# Patient Record
Sex: FEMALE | Race: OTHER | Employment: UNEMPLOYED | URBAN - METROPOLITAN AREA
[De-identification: names, ages, dates, MRNs, and addresses within clinical notes are randomized per-mention and may not be internally consistent; named-entity substitution may affect disease eponyms.]

---

## 2017-04-20 ENCOUNTER — HOSPITAL ENCOUNTER (OUTPATIENT)
Dept: LAB | Age: 27
Discharge: HOME | End: 2017-04-20

## 2017-04-20 ENCOUNTER — OFFICE VISIT (OUTPATIENT)
Dept: FAMILY MEDICINE CLINIC | Age: 27
End: 2017-04-20

## 2017-04-20 VITALS
DIASTOLIC BLOOD PRESSURE: 79 MMHG | WEIGHT: 132 LBS | HEIGHT: 60 IN | BODY MASS INDEX: 25.91 KG/M2 | HEART RATE: 96 BPM | SYSTOLIC BLOOD PRESSURE: 120 MMHG | TEMPERATURE: 98.4 F

## 2017-04-20 DIAGNOSIS — R30.0 BURNING WITH URINATION: Primary | ICD-10-CM

## 2017-04-20 DIAGNOSIS — R30.0 BURNING WITH URINATION: ICD-10-CM

## 2017-04-20 DIAGNOSIS — B37.41 CANDIDA CYSTITIS: ICD-10-CM

## 2017-04-20 LAB
BILIRUB UR QL STRIP: NEGATIVE
GLUCOSE UR-MCNC: NEGATIVE MG/DL
KETONES P FAST UR STRIP-MCNC: NEGATIVE MG/DL
PH UR STRIP: 8.5 [PH] (ref 4.6–8)
PROT UR QL STRIP: ABNORMAL MG/DL
SP GR UR STRIP: 1.01 (ref 1–1.03)
UA UROBILINOGEN AMB POC: NORMAL (ref 0.2–1)
URINALYSIS CLARITY POC: CLEAR
URINALYSIS COLOR POC: YELLOW
URINE BLOOD POC: NEGATIVE
URINE LEUKOCYTES POC: NEGATIVE
URINE NITRITES POC: NEGATIVE

## 2017-04-20 PROCEDURE — 87086 URINE CULTURE/COLONY COUNT: CPT | Performed by: NURSE PRACTITIONER

## 2017-04-20 PROCEDURE — 87491 CHLMYD TRACH DNA AMP PROBE: CPT | Performed by: NURSE PRACTITIONER

## 2017-04-20 PROCEDURE — 87147 CULTURE TYPE IMMUNOLOGIC: CPT | Performed by: NURSE PRACTITIONER

## 2017-04-20 RX ORDER — FLUCONAZOLE 150 MG/1
150 TABLET ORAL DAILY
Qty: 1 TAB | Refills: 1 | Status: SHIPPED | OUTPATIENT
Start: 2017-04-20 | End: 2017-04-21

## 2017-04-20 RX ORDER — FLUCONAZOLE 150 MG/1
150 TABLET ORAL DAILY
Qty: 1 TAB | Refills: 1 | Status: SHIPPED | OUTPATIENT
Start: 2017-04-20 | End: 2017-04-20 | Stop reason: SDUPTHER

## 2017-04-20 NOTE — PROGRESS NOTES
At discharge station AVS was printed and reviewed with pt. Printed Good Rx coupon for Diflucan and rerouted RX to [facility] to save pt money since it is available for $4.00 instead of over $13. She knows that we will call with lab results and to RTC PRN if symptoms do not improve in one to two weeks.  Cale Terrell RN

## 2017-04-20 NOTE — PROGRESS NOTES
Subjective:     Chief Complaint   Patient presents with    Vaginal Itching     discharge, and burning with urination        She  is a 28 y.o. female who presents for evaluation of vaginal itching, dyspareunia and urinary burning. Pt notes dyspareunia (notes it as a annoyance vs true pain) started 2 weeks ago then she noted some whitish/yellow discharge. Approx 1 week ago Pt notes some \"strong\" urine but it is not the same as her UTI S&S last year. Denies frequency. Pt only attempted relief w/ water. Pt denies any flu like S&S, back pain nor fevers/chills. LMP end of march, last pap was last year and was WNL. Pt is not breastfeeding. ROS  Gen - no fever/chills  Resp - no dyspnea or cough  CV - no chest pain or BARNETT  Rest per HPI    Past Medical History:   Diagnosis Date    Screening for malignant neoplasm of the cervix 6/12/15    Negative (no hpv)      Past Surgical History:   Procedure Laterality Date    HX WISDOM TEETH EXTRACTION       Current Outpatient Prescriptions on File Prior to Visit   Medication Sig Dispense Refill    norethindrone (MICRONOR) 0.35 mg tab Take 1 Tab by mouth daily. 84 Tab 1    HYDROcodone-acetaminophen (NORCO) 5-325 mg per tablet Take 1 Tab by mouth every four (4) hours as needed for Pain. Max Daily Amount: 6 Tabs. 20 Tab 0    oxyCODONE-acetaminophen (PERCOCET) 5-325 mg per tablet Take 1-2 Tabs by mouth every four (4) hours as needed for Pain. Max Daily Amount: 12 Tabs. 15 Tab 0    AMBULATORY BREAST PUMP Use as directed. z39.1 1 Units 0    prenatal no. 39-iron-FA #6-dha 30 mg iron-1 mg -300 mg cmpk Take 2 Caps by mouth daily. 60 Each 12     No current facility-administered medications on file prior to visit.          Objective:     Vitals:    04/20/17 1047   BP: 120/79   Pulse: 96   Temp: 98.4 °F (36.9 °C)   TempSrc: Oral   Weight: 132 lb (59.9 kg)   Height: 5' 0.43\" (1.535 m)       Physical Examination:  General appearance - alert, well appearing, and in no distress  Eyes -sclera anicteric  Neck - supple, no significant adenopathy, no thyromegaly  Chest - clear to auscultation, no wheezes, rales or rhonchi, symmetric air entry  Heart - normal rate, regular rhythm, normal S1, S2, no murmurs, rubs, clicks or gallops  Neurological - alert, oriented, no focal findings or movement disorder noted  Abdomen-BS present/WNL x 4 quads, non-tender/distended, soft,no organomegaly    Recent Results (from the past 12 hour(s))   AMB POC URINALYSIS DIP STICK MANUAL W/O MICRO    Collection Time: 04/20/17 11:11 AM   Result Value Ref Range    Color (UA POC) Yellow     Clarity (UA POC) Clear     Glucose (UA POC) Negative Negative    Bilirubin (UA POC) Negative Negative    Ketones (UA POC) Negative Negative    Specific gravity (UA POC) 1.010 1.001 - 1.035    Blood (UA POC) Negative Negative    pH (UA POC) 8.5 (A) 4.6 - 8.0    Protein (UA POC) 1+ Negative mg/dL    Urobilinogen (UA POC) normal 0.2 - 1    Nitrites (UA POC) Negative Negative    Leukocyte esterase (UA POC) Negative Negative         Assessment/ Plan:   Jessenia Martin was seen today for vaginal itching. Diagnoses and all orders for this visit:    Burning with urination  -     AMB POC URINALYSIS DIP STICK MANUAL W/O MICRO  -     CULTURE, URINE; Future  -     CHLAMYDIA / Danella Pretzel; Future    Candida cystitis  -     fluconazole (DIFLUCAN) 150 mg tablet; Take 1 Tab by mouth daily for 1 day. Repeat dose in one week PRN. Low suspicion of UTI given UA/Hx. Given symptoms, suspect possible candida. Start Diflucan. R/O G/C and bacterial causes. Change POC based on urine studies. RTC PRN. Advised Pt to call if no improvement S&S in 1-2 weeks. May consider referral for pelvic exam.     I have discussed the diagnosis with the patient and the intended plan as seen in the above orders. The patient has received an after-visit summary and questions were answered concerning future plans.   I have discussed medication side effects and warnings with the patient as well. The patient verbalizes understanding and agreement with the plan. Follow-up Disposition:  Return if symptoms worsen or fail to improve.

## 2017-04-20 NOTE — PROGRESS NOTES
Coordination of Care  1. Have you been to the ER, urgent care clinic since your last visit? Hospitalized since your last visit? No    2. Have you seen or consulted any other health care providers outside of the [address] [place] [address] since your last visit? Include any pap smears or colon screening.  No    Medications  Medication Reconciliation Performed: no  Patient does not know need refills     Learning Assessment Complete? no  Results for orders placed or performed in visit on 04/20/17   AMB POC URINALYSIS DIP STICK MANUAL W/O MICRO   Result Value Ref Range    Color (UA POC) Yellow     Clarity (UA POC) Clear     Glucose (UA POC) Negative Negative    Bilirubin (UA POC) Negative Negative    Ketones (UA POC) Negative Negative    Specific gravity (UA POC) 1.010 1.001 - 1.035    Blood (UA POC) Negative Negative    pH (UA POC) 8.5 (A) 4.6 - 8.0    Protein (UA POC) 1+ Negative mg/dL    Urobilinogen (UA POC) normal 0.2 - 1    Nitrites (UA POC) Negative Negative    Leukocyte esterase (UA POC) Negative Negative

## 2017-04-20 NOTE — PATIENT INSTRUCTIONS
Candidiasis: Instrucciones de cuidado - [ Candidiasis: Care Instructions ]  Instrucciones de cuidado  La candidiasis es eun infección causada por el hongo en forma de levadura del tipo cándida. Por lo general, los hongos cándida viven en r[place]dale cuerpo. Sin embargo, pueden causar problemas si las defensas del organismo no funcionan jorje deberían. Algunos medicamentos pueden aumentar lisette probabilidades de contraer eun infección por cándida. Estos incluyen los antibióticos, los esteroides y los medicamentos para el cáncer. [address] y la diabetes pueden hacer que usted tenga infecciones por cándida con mayor facilidad. Hay varios tipos de infecciones por hongos en forma de levadura (cándida). Las aftas (candidosis bucal) es eun infección en la boca causada por la cándida. Suele ocurrir en personas cuyo sistema inmunitario es débil. Provoca manchas kiran en el interior de la boca y la garganta. Las infecciones cutáneas por cándida suelen ocurrir en los pliegues de piel donde esta se [place]. Provocan la aparición de manchas rojizas con secreción en la piel. Los bebés pueden contraer estas infecciones bajo el pañal. Las personas que utilizan guantes a menudo pueden tenerlas en las ana cristina. Muchas mujeres contraen candidiasis vaginal. Es muy común cuando las mujeres francisco antibióticos. Esta infección puede provocar Laura Zayra y ardor en la vagina. Igualmente puede sri lugar a un flujo similar al requesón (\"cottage cheese\"). En casos raros, la cándida [employer]. [place] puede causar [employer]. hazel tipo de infección se trata con medicamentos administrados por medio de eun inyección en eun vena (IV). Las infecciones por cándida suelen desaparecer rápidamente eun vez que se comienza el [place]. Sin embargo, si r[place]dale sistema inmunitario es débil, la infección podría reaparecer. Avísele a r[place]dale médico si contrae infecciones por cándida con frecuencia.   [address] seguimiento es eun parte clave de ragsdale tratamiento y seguridad. Asegúrese de hacer y acudir a todas las citas, y llame a r[place] médico si está teniendo problemas. También es eun buena idea saber los resultados de los exámenes y mantener eun lista de los medicamentos que geo. ¿Cómo puede cuidarse en el hogar? · [employer] medicamentos exactamente jorje le fueron recetados. Llame a r[place]dale médico si agustín estar teniendo un problema con r[place]dale medicamento. · [place] antibióticos solo cuando se lo recomiende el médico.  · [place] yogur con [employer] Contiene eun bacteria llamada lactobacilo que podría ayudar a prevenir algunos tipos de infecciones por cándida. · Mantenga la piel limpia y seca. Use talco en las zonas húmedas. · Si utiliza cremas o supositorios para tratar la candidiasis vaginal, no use preservativos ni diafragmas. Utilice otro método anticonceptivo. · Coma eun dieta saludable y christopher ejercicio regularmente. [place] ayudará a mantener isak ragsdale sistema inmunitario. ¿Cuándo debe pedir ayuda? Llame a r[place]dale médico ahora mismo o busque atención médica inmediata si:  · Tiene fiebre. · Dewain Manju y tiene señales de eun infección vaginal o eun infección urinaria, tales jorje:  ¨ Picazón intensa en la vagina. ¨ Dolor chang el acto sexual o cuando orina. ¨ Flujo vaginal anormal.  ¨ Ganas frecuentes de orinar. ¨ Tiene la orina turbia o con [employer]. Preste especial atención a los cambios en ragsdale [place] y asegúrese de comunicarse con ragsdale médico si:  · No mejora jorje se esperaba. ¿Dónde puede encontrar más información en inglés? Oralia Savealessandra a http://all-yolis.info/. Esmer Lights R622 en la búsqueda para aprender más acerca de \"Candidiasis: Instrucciones de cuidado - [ Candidiasis: Care Instructions ]. \"  Revisado: 13 octubre, 2016  Versión del contenido: 11.2  © 7451-7604 [employer].  Las instrucciones de cuidado fueron adaptadas bajo licencia por Good Help Connections (which disclaims liability or warranty for this information). Si usted tiene [place] afección médica o sobre estas instrucciones, siempre pregunte a r[place] profesional de [place]. [facility] niega toda garantía o responsabilidad por r[place] uso de esta información.

## 2017-04-21 LAB
C TRACH DNA SPEC QL NAA+PROBE: NEGATIVE
N GONORRHOEA DNA SPEC QL NAA+PROBE: NEGATIVE
SAMPLE TYPE: NORMAL
SERVICE CMNT-IMP: NORMAL
SPECIMEN SOURCE: NORMAL

## 2017-04-22 LAB
BACTERIA SPEC CULT: ABNORMAL
CC UR VC: ABNORMAL
SERVICE CMNT-IMP: ABNORMAL

## 2018-02-15 ENCOUNTER — OFFICE VISIT (OUTPATIENT)
Dept: FAMILY MEDICINE CLINIC | Age: 28
End: 2018-02-15

## 2018-02-15 VITALS
WEIGHT: 134 LBS | HEIGHT: 60 IN | BODY MASS INDEX: 26.31 KG/M2 | HEART RATE: 89 BPM | TEMPERATURE: 98.9 F | SYSTOLIC BLOOD PRESSURE: 117 MMHG | DIASTOLIC BLOOD PRESSURE: 83 MMHG

## 2018-02-15 DIAGNOSIS — N92.6 MISSED PERIOD: Primary | ICD-10-CM

## 2018-02-15 LAB
HCG URINE, QL. (POC): NEGATIVE
VALID INTERNAL CONTROL?: YES

## 2018-02-15 RX ORDER — RANITIDINE 150 MG/1
150 TABLET, FILM COATED ORAL 2 TIMES DAILY
Qty: 60 TAB | Refills: 2 | Status: ON HOLD | OUTPATIENT
Start: 2018-02-15 | End: 2018-10-19

## 2018-02-15 NOTE — PATIENT INSTRUCTIONS
No coma grasa, caffeina, o picante. No geo pastillas jorje aspirina, ibuprofen, aleve, diclofenacoa. Gastritis: Instrucciones de cuidado - [ Gastritis: Care Instructions ]  Instrucciones de cuidado    La gastritis es dolor y malestar estomacal. Sucede cuando algo irrita el revestimiento del estómago. Hay muchas cosas que pueden causarla. Entre estas se incluyen eun infección jorje la gripe o algo que ha comido o bebido. Los medicamentos o eun llaga en el recubrimiento del estómago ([place]) también pueden causarla. Puede tener abotagamiento y dolor abdominal. Podría eructar, vomitar y tener revoltura estomacal.  Usted debería poder aliviar el problema tomando medicamentos. Y july vez sería útil cambiar la alimentación. Si la gastritis continúa, ragsdale médico podría recetarle medicamentos. La atención de seguimiento es eun parte clave de ragsdale tratamiento y seguridad. Asegúrese de hacer y acudir a todas las citas, y llame a ragsdale médico si está teniendo problemas. También es eun buena idea saber los resultados de los exámenes y mantener eun lista de los medicamentos que geo. ¿Cómo puede cuidarse en el hogar? · Si r[place]dale médico le recetó antibióticos, tómelos según las indicaciones. No deje de tomarlos por el hecho de sentirse mejor. Debe christiane todos los antibióticos hasta terminarlos. · Sea ana laura con los medicamentos. Si ragsdale médico le recetó un medicamento para reducir el ácido estomacal, tómelo según las indicaciones. Llame a ragsdale médico si agustín estar teniendo problemas con ragsdale medicamento. · No tome ningún otro medicamento, incluyendo analgésicos (medicamentos para el dolor) de venta verna, sin consultar con ragsdale médico teo. · Si ragsdale médico le recomienda medicamentos de venta verna para reducir el ácido estomacal, tales jorje Pepcid AC, Prilosec, Tagamet HB o Zantac 75, siga las instrucciones de la etiqueta.   · Celeste abundantes líquidos (los suficientes jorje para que r[place]dale orina sea de color amarillo pawel o transparente jorje el agua) para prevenir la deshidratación. Elija christiane agua y otros líquidos zamzam sin cafeína. Si tiene [employer] [facility] [employer], el corazón o el hígado y tiene que Anny's líquidos, hable con ragsdale médico antes de aumentar ragsdale consumo. · Limite la cantidad de alcohol que nettie. · Evite el café, el té, las bebidas de cola, el chocolate y otros alimentos que contengan cafeína. Aumentan el ácido estomacal.  ¿Cuándo debe pedir ayuda? Llame al 911 en cualquier momento que considere que necesita atención de [place]. Por ejemplo, llame si:  ? · Vomita evelio o algo parecido a granos de café molido. ? · Bibi heces son de color rojizo o muy sanguinolentas (con evelio). ?Llame a ragsdale médico ahora mismo o busque atención médica inmediata si:  ? · Empieza a respirar en forma acelerada y no ha producido [place] en las últimas 8 horas. ? · No puede retener líquidos en el estómago. ?Preste especial atención a los cambios en ragsdale [place] y asegúrese de comunicarse con ragsdale médico si:  ? · No mejora jorje se esperaba. ¿Dónde puede encontrar más información en inglés? [place] a http://all-yolis.info/. Velton Kawasaki U724 en la búsqueda para aprender más acerca de \"Gastritis: Instrucciones de cuidado - [ Gastritis: Care Instructions ]. \"  Revisado: 12 sutton, 2017  Versión del contenido: 11.4  © 7963-3796 Healthwise, Incorporated. Las instrucciones de cuidado fueron adaptadas bajo licencia por Good Help Connections (which disclaims liability or warranty for this information). Si usted tiene [place] afección médica o sobre estas instrucciones, siempre pregunte a r[place]dale profesional de phoenix. [facility] niega toda garantía o responsabilidad por r[place]dale uso de esta información.

## 2018-02-15 NOTE — PROGRESS NOTES
Chief Complaint   Patient presents with    Epigastric Pain     Coordination of Care  1. Have you been to the ER, urgent care clinic since your last visit? Hospitalized since your last visit? No    2. Have you seen or consulted any other health care providers outside of the [address] [place] [address] since your last visit? Include any pap smears or colon screening. No    Medications  Does the patient need refills? NO    Learning Assessment Complete?  yes

## 2018-02-15 NOTE — PROGRESS NOTES
HISTORY OF PRESENT ILLNESS  Salud Gan is a 33 y.o. female. HPI Comments: 1 month h/o epigastric pain radiating to the back, worst after eating. Sx much worse over the last few days. No n/v or dark stools. Recently quit the bcp because of nausea and headaches, is not using bc now. lmp early January, preg test today here neg. H/o cholecyst in brandon, and the pain is similar to that. Is no longer breast-feeding. Uses no etoh/tobacco, occas otc nsaids, avoids caffeine and greasy foods. Epigastric Pain          ROS    Physical Exam   Constitutional: She appears well-nourished. No distress. Neck: No thyromegaly present. Cardiovascular: Normal rate and regular rhythm. Exam reveals no gallop and no friction rub. No murmur heard. Pulmonary/Chest: Breath sounds normal.   Abdominal: Soft. She exhibits no mass. There is tenderness. Epigastric tenderness. ASSESSMENT and PLAN  Gastritis-- ranitidine, dietary changes, avoid nsaids. F/u 1-2 months if sx persist, and then would check h. Pylori.

## 2018-05-07 LAB — URINALYSIS, EXTERNAL: NORMAL

## 2018-05-11 ENCOUNTER — OFFICE VISIT (OUTPATIENT)
Dept: OBGYN CLINIC | Age: 28
End: 2018-05-11

## 2018-05-11 VITALS
DIASTOLIC BLOOD PRESSURE: 60 MMHG | HEART RATE: 84 BPM | HEIGHT: 61 IN | BODY MASS INDEX: 25.86 KG/M2 | WEIGHT: 137 LBS | SYSTOLIC BLOOD PRESSURE: 102 MMHG

## 2018-05-11 DIAGNOSIS — Z32.01 POSITIVE PREGNANCY TEST: ICD-10-CM

## 2018-05-11 DIAGNOSIS — N92.6 MISSED MENSES: Primary | ICD-10-CM

## 2018-05-11 LAB
ANTIBODY SCREEN, EXTERNAL: NORMAL
CHLAMYDIA, EXTERNAL: NORMAL
HBSAG, EXTERNAL: NORMAL
HCT, EXTERNAL: 34.8
HGB, EXTERNAL: 11.7
HIV, EXTERNAL: NORMAL
N. GONORRHEA, EXTERNAL: NORMAL
PAP SMEAR, EXTERNAL: NORMAL
PLATELET CNT,   EXTERNAL: 255
RUBELLA, EXTERNAL: NORMAL
T. PALLIDUM, EXTERNAL: NORMAL

## 2018-05-11 RX ORDER — UREA 10 %
800 LOTION (ML) TOPICAL DAILY
Status: ON HOLD | COMMUNITY
End: 2018-10-19

## 2018-05-11 NOTE — PATIENT INSTRUCTIONS
[facility]Chart Help [facility]: [phone]       Magdalene Stephanie - [ Learning About Pregnancy ]  Instrucciones de cuidado    Bocanegra phoenix chang las primeras semanas del [facility] es de particular importancia para la phoenix de bocanegra bebé. Cuídese. Cualquier cosa que perjudique bocanegra organismo puede perjudicar a bocanegra bebé. Asegúrese de asistir a todas lisette citas médicas. Los chequeos médicos regulares les ayudarán a usted y a bocanegra bebé a mantenerse saludables. La atención de seguimiento es eun parte clave de bocanegra tratamiento y seguridad. Asegúrese de hacer y acudir a todas las citas, y llame a bocanegra médico si está teniendo problemas. También es eun buena idea saber los resultados de los exámenes y mantener eun lista de los medicamentos que geo. Cómo puede cuidarse en el hogar? ? Dieta  ? · Siga eun dieta balanceada. Asegúrese de incluir en bocanegra dieta abundantes frijoles (habichuelas), arvejas (chícharos) y verduras de hojas verdes. ? · No se saltee las comidas ni pase muchas horas sin comer. Si tiene náuseas, trate de comer un refrigerio pequeño y saludable cada 2 a 3 horas. ? · No consuma pescados que tengan [address] de sarah, jorje tiburón, pez que o [place]. No coma más de eun jazmine de atún a la semana. ? · Celeste abundantes líquidos, suficientes para que bocanegra orina sea de color amarillo pawel o transparente jorje el agua. Si tiene [employer], el corazón o el hígado y tiene que [place]'s líquidos, hable con bocanegra médico antes de aumentar bocanegra consumo. ? · Reduzca la cafeína, jorje el café, el té y las bebidas de cola. ? · No celeste alcohol, jorje cerveza, vino o licor isak. ? · [place] un multivitamínico que contenga al menos 400 microgramos (mcg) de ácido fólico para ayudar a prevenir los defectos congénitos (de nacimiento). El cereal enriquecido y el perez integral son buenas rivera adicionales de ácido fólico.   ? · Aumente el calcio en bocanegra [place].  Trate de beber un cuarto de javier [employer] todos los días. También podría christiane suplementos de calcio y elegir alimentos jorje el queso y el yogur. ? [address]  ? · Asegúrese de asistir a las citas de seguimiento. ? · Descanse lo suficiente. Mientras esté embarazada podría sentirse más cansada de lo normal.   ? · Aby por lo menos 30 minutos de ejercicio la mayoría de los días de la [place]. Caminar es eun buena opción. Si no ha hecho ejercicio en el pasado, comience poco a poco. Aby varias caminatas cortas todos los días. ? · No fume. Si necesita ayuda para dejar de fumar, hable con r[place] médico sobre los programas para dejar de fumar. Éstos pueden aumentar lisette probabilidades de dejar el hábito para siempre. ? · No toque heces de nnamdi ni r[place]dale caja de excrementos. Además, lávese las ana cristina luego de manipular carne cruda y cocine jessica toda la carne antes de comerla. Use guantes cuando trabaje en el jardín y [employer] ana cristina cuando termine. Las heces de [employer], la carne cruda o poco cocida, y la yuriy contaminada pueden causar infecciones que podrían perjudicar a ragsdale bebé o conducir a un aborto espontáneo. ? · No use \"jacuzzis\" ni saunas. Elevar la temperatura corporal podría perjudicar a ragsdale bebé. ? · Evite los gases de las sustancias químicas y la pintura, o los venenos. ? · No use drogas ilegales ni aleksandar alcohol. Medicamentos  ? · Revise todos los medicamentos que esté tomando con ragsdale [place] sea necesario cambiar algunos de lisette medicamentos de rutina para proteger al bebé. ? · [place] acetaminofén (Tylenol) para [facility], jorje dolor de [place] o de espalda leves o fiebre leve con síntomas de resfriado. No utilice medicamentos antiinflamatorios no esteroideos (EDUARDO), tales jorje ibuprofeno (Advil, Motrin) o naproxeno (Aleve), a menos que ragsdale médico lo autorice. ? · No tome dos o más analgésicos (medicamentos para el dolor) al [employer] tiempo a menos que el médico se lo haya indicado.  Muchos analgésicos contienen acetaminofén, es decir, Tylenol. El exceso de acetaminofén (Tylenol) puede ser dañino. ? · [employer]onic exactamente jorje le fueron recetados. Llame a ragsdale médico si agustín estar teniendo problemas con ragsdale medicamento. ?[address]  ? · Si siente náuseas al levantarse, pruebe christiane un refrigerio pequeño (jorje galletas saladas) antes de salir de la cama. Dese algún tiempo para digerir el refrigerio y salga de la cama lentamente. ? · No se saltee las comidas ni pase mucho tiempo sin comer. Un estómago [address] náuseas. ? · Consuma comidas pequeñas y frecuentes en lugar de ghassan comidas abundantes al día. ? · [place] abundantes líquidos. Las bebidas deportivas, jorje Gatorade o [place], son buenas opciones. ? · Consuma alimentos ricos en proteínas anyi bajos en grasas. ? · Si está tomando suplementos de leatha, pregúntele a ragsdale médico si son necesarios. El leatha puede [employer]. ? · Evite cualquier [employer] produzca náuseas, jorje el del café. ? · Descanse mucho. Las náuseas del embarazo podrían empeorar si está cansada. Dónde puede encontrar más información en inglés? Jarad Thurston a http://all-yolis.info/. Heather Vela N391 en la búsqueda para aprender más acerca de \"Aprenda sobre el Yoselin Julien - [ Raúl Zuñiga About Pregnancy ]. \"  Revisado: 16 marzo, 2017  Versión del contenido: 11.4  © 6009-5568 Healthwise, Incorporated. Las instrucciones de cuidado fueron adaptadas bajo licencia por Good Help Connections (which disclaims liability or warranty for this information). Si usted tiene [place] afección médica o sobre estas instrucciones, siempre pregunte a r[place]dale profesional de [place]. [facility] niega toda garantía o responsabilidad por r[place]dale uso de esta información.

## 2018-05-11 NOTE — MR AVS SNAPSHOT
[address] [place] [address] [address] [place] [address] 
[phone] Patient: Gabriel Delgado MRN: [id number] Visit Information Jose Downey y Shashi Personal Médico Departamento Teléfono del Dep. Número de visita 5/11/2018 10:00 AM Camilla Cox, [address] [phone] Your Appointments 5/18/2018  9:30 AM  
[facility] with MARISSA [facility] ([facility] Appt Note: EOB/US DM pt/? lmp 1/15/18/blue phone for Mauritanian; U/S & f/u  
 [address] [place] [address] [address] [id number]  
[address] 5/18/2018  9:40 AM  
ESTABLISHED PATIENT with MD Javi Barajas ([facility]) Appt Note: U/S & f/u  
 [address] [facility] [address] [address]  
[address] Upcoming Health Maintenance Date Due  
 PAP AKA CERVICAL CYTOLOGY 6/12/2018 Influenza Age 5 to Adult 8/1/2018 Alergias  Review Complete El: 5/11/2018 Por: Анна Benny A partir del:  5/11/2018 No Known Allergies Vacunas actuales Revisadas el:  12/9/2015 Aurelia Bryan Influenza Vaccine Page Echevaria) 9/29/2015 Tdap 10/27/2015  2:02 PM  
  
 No revisadas esta visita Partes vitales PS [place] ( percentil de crecimiento) Peso (percentil de crecimiento) LMP (última hailey) Está amamantando?  
 102/60 84 5' 1\" (1.549 m) 137 lb (62.1 kg) 01/15/2018 (Approximate) No  
 BMI (IMC) Estado obstétrico Estatus de tabaquísmo 25.89 kg/m2 Pregnant Never Smoker BMI and BSA Data Body Mass Index Body Surface Area  
 25.89 kg/m 2 1.63 m 2 Pranay Iraheta [facility] Name Phone  [facility] [place] [facility], [address] [facility] [phone] Bocanegra lista de medicamentos actualizada Darryle Mass actualizada 5/11/18 10:41 AM.  Tariq Romp use bocanegra lista de medicamentos más reciente. folic acid 265 mcg tablet Take 800 mcg by mouth daily. PRENATA PO Take  by mouth. raNITIdine 150 mg tablet También conocido jorje:  ZANTAC Take 1 Tab by mouth two (2) times a day. Instrucciones para el Paciente MyChart Help Desk: [phone] Elvera Mynor - [ Learning About Pregnancy ] Instrucciones de cuidado Bocanegra phoenix chang las primeras semanas del [facility] es de particular importancia para la phoenix de bocanegra bebé. Cuídese. Cualquier cosa que perjudique bocanegra organismo puede perjudicar a bocanegra bebé. Asegúrese de asistir a todas lisette citas médicas. Los chequeos médicos regulares les ayudarán a usted y a bocanegra bebé a mantenerse saludables. La atención de seguimiento es eun parte clave de bocanegra tratamiento y seguridad. Asegúrese de hacer y acudir a todas las citas, y llame a bocanegra médico si está teniendo problemas. También es eun buena idea saber los resultados de los exámenes y mantener eun lista de los medicamentos que geo. Cómo puede cuidarse en el hogar? ? Dieta ? · Siga eun dieta balanceada. Asegúrese de incluir en bocanegra dieta abundantes frijoles (habichuelas), arvejas (chícharos) y verduras de hojas verdes. ? · No se saltee las comidas ni pase muchas horas sin comer. Si tiene náuseas, trate de comer un refrigerio pequeño y saludable cada 2 a 3 horas. ? · No consuma pescados que tengan [address] de sarah, jorje tiburón, pez que o [place]. No coma más de eun jazmine de atún a la semana. ? · Celeste abundantes líquidos, suficientes para que bocanegra orina sea de color amarillo pawel o transparente jorje el agua. Si tiene [facility] [place] [employer], el corazón o el hígado y tiene que [place]'s líquidos, hable con bocanegra médico antes de aumentar bocanegra consumo. ? · Reduzca la cafeína, jorje el café, el té y las bebidas de cola. ? · No aleksandar alcohol, jorje cerveza, vino o licor isak. ? · Box un multivitamínico que contenga al menos 400 microgramos (mcg) de ácido fólico para ayudar a prevenir los defectos congénitos (de nacimiento). El cereal enriquecido y el perez integral son buenas rivera adicionales de ácido fólico.  
? · Aumente el calcio en ragsdale Zachary Nares. Trate de beber un cuarto de galón de Incomparable Things. También podría christiane suplementos de calcio y elegir alimentos jorje el queso y el yogur. ? [address] ? · Asegúrese de asistir a las citas de seguimiento. ? · Descanse lo suficiente. Mientras esté embarazada podría sentirse más cansada de lo normal.  
? · Aby por lo menos 30 minutos de ejercicio la mayoría de los días de la [place]. Caminar es eun buena opción. Si no ha hecho ejercicio en el pasado, comience poco a poco. Aby varias caminatas cortas todos los días. ? · No fume. Si necesita ayuda para dejar de fumar, hable con ragsdale médico sobre los programas para dejar de fumar. Éstos pueden aumentar lisette probabilidades de dejar el hábito para siempre. ? · No toque heces de nnamdi ni ragsdale caja de excrementos. Además, lávese las ana cristina luego de manipular carne cruda y cocine jessica toda la carne antes de comerla. Use guantes cuando trabaje en el jardín y [employer] ana cristina cuando termine. Las heces de Roseanne, la carne cruda o poco cocida, y la yuriy contaminada pueden causar infecciones que podrían perjudicar a ragsdale bebé o conducir a un aborto espontáneo. ? · No use \"jacuzzis\" ni saunas. Elevar la temperatura corporal podría perjudicar a ragsdale bebé. ? · Evite los gases de las sustancias químicas y la pintura, o los venenos. ? · No use drogas ilegales ni aleksandar alcohol. Medicamentos ? · Revise todos los medicamentos que esté tomando con ragsdale Viridiana Mazariegos sea necesario cambiar algunos de lisette medicamentos de rutina para proteger al bebé. ? · [place] acetaminofén (Tylenol) para [employer], jorje dolor de [place] o de espalda leves o fiebre leve con síntomas de resfriado. No utilice medicamentos antiinflamatorios no esteroideos (EDUARDO), tales jorje ibuprofeno (Advil, Motrin) o naproxeno (Aleve), a menos que ragsdale médico lo autorice. ? · No tome dos o más analgésicos (medicamentos para el dolor) al [employer] tiempo a menos que el médico se lo haya indicado. Muchos analgésicos contienen acetaminofén, es decir, Tylenol. El exceso de acetaminofén (Tylenol) puede ser dañino. ? · Medtronic exactamente jorje le fueron recetados. Llame a ragsdale médico si agustín estar teniendo problemas con ragsdale medicamento. ?82 [facility] ? · Si siente náuseas al levantarse, pruebe christiane un refrigerio pequeño (jorje galletas saladas) antes de salir de la cama. Dese algún tiempo para digerir el refrigerio y salga de la cama lentamente. ? · No se saltee las comidas ni pase mucho tiempo sin comer. Un estómago [address] náuseas. ? · Consuma comidas pequeñas y frecuentes en lugar de ghassan comidas abundantes al día. ? · [place] abundantes líquidos. Las bebidas deportivas, jorje Gatorade o Kurt, son buenas opciones. ? · Consuma alimentos ricos en proteínas anyi bajos en grasas. ? · Si está tomando suplementos de leatha, pregúntele a ragsdale médico si son necesarios. El leatha puede [employer]. ? · Evite cualquier [employer] produzca náuseas, jorje el del café. ? · Descanse mucho. Las náuseas del embarazo podrían empeorar si está cansada. Dónde puede encontrar más información en inglés? Minor Cordia a http://all-yolis.info/. Duc VAZQUEZ en la búsqueda para aprender más acerca de \"Aprenda sobre el Sonjia Conquest - [ Vernida Rom About Pregnancy ]. \" 
Revisado: 16 marzo, 2017 Versión del contenido: 11.4 © 7607-3947 Healthwise, Incorporated.  Las instrucciones de cuidado fueron adaptadas bajo licencia por [employer] (which disclaims liability or warranty for this information). Si usted tiene [place] afección médica o sobre estas instrucciones, siempre pregunte a ragsdale profesional de phoenix. [facility], Incorporated niega toda garantía o responsabilidad por ragsdale uso de esta información. Introducing [facility]! [facility] introduce portal paciente MyChart . Ahora se puede acceder a partes de ragsdale expediente médico, enviar por correo electrónico la oficina de ragsdale médico y solicitar renovaciones de medicamentos en línea. En ragsdale navegador de Internet , Tyrell Mota a [email or web address] Hcristopher clic en el usuario por Amelia Jazmyn? Andreas Rough clic aquí en la sesión Johan Calero. Verá la página de registro [place]. Ingrese ragsdale código de [employer] july y jorje aparece a continuación. Usted no tendrá que [employer] código después de osbaldo completado el proceso de registro . Si usted no se inscribe antes de la fecha de caducidad , debe solicitar un nuevo código. · MyChart Código de acceso : [id number] Expires: 8/9/2018 10:41 AM 
 
Ingresa los últimos cuatro dígitos de ragsdale Número de Seguro Social ( xxxx ) y fecha de nacimiento ( dd / mm / aaaa ) jorje se indica y christopher clic en Enviar. Aurelia será llevado a la siguiente página de registro . Crear un ID MyChart . Esta será ragsdale ID de inicio de sesión de MyChart y no puede ser [place] , por lo que pensar en eun que es [place] y fácil de recordar . Crear eun contraseña MyChart . Usted puede cambiar ragsdale contraseña en cualquier momento . Ingrese ragsdale Password Reset de preguntas y [place] . [place] se puede utilizar en un momento posterior si usted olvida ragsdale contraseña. Introduzca ragsdale dirección de correo electrónico . Abril Hewitt recibirá eun notificación por correo electrónico cuando la nueva información está disponible en MyChart . Estil Gary price en Registrarse.  Lattie Melter javi y descargar porciones de ragsdale expediente Nj price en el enlace de descarga del menú Resumen para descargar eun copia portátil de ragsdale información médica . Si tiene [employer] , por favor visite la sección de preguntas frecuentes del sitio web MyChart . Recuerde, MyChart NO es que se utilizará para las necesidades urgentes. Para emergencias médicas , llame al 911 . Ahora disponible en ragsdale iPhone y Android ! Por favor proporcione hazel resumen de la documentación de cuidado a ragsdale próximo proveedor. Your primary care clinician is listed as Francisco Johnson. If you have any questions after today's visit, please call [phone].

## 2018-05-11 NOTE — PROGRESS NOTES
Current pregnancy history:    Blane Samano is a ,  33 y.o. female OTHER Patient's last menstrual period was 01/15/2018 (approximate). .  She presents for the evaluation of amenorrhea and a positive pregnancy test.    LMP history:  The date of her LMP is not certain. Knows she had period in December, had bleeding. Had neg UPT 2/15/18 ([facility]). Her last menstrual period was normal and lasted for 4 to 5 days. A urine pregnancy test was positive in March. She was not on the pill at conception. Based on her LMP, her EDC is 10/22/18 and her EGA is 16 weeks,4 days. Her menstrual cycles are irregular     Ultrasound data:  She had an ultrasound appt today, but arrived too late. Rescheduled for next Friday, 18. Pregnancy symptoms:    Since her LMP she has experienced  urinary frequency, breast tenderness, and nausea. She has not been vomiting over the last few weeks. Associated signs and symptoms which she denies: dysuria, discharge, vaginal bleeding. She states she has gained weight:  None    Relevant past pregnancy history:   She has the following pregnancy history: Her last pregnancy was uncomplicated. She has no history of  delivery. Relevant past medical history:(relevant to this pregnancy): noncontributory. Pap/Occupational history:  Last pap smear: 6/12/15 Results: Normal      Her occupation is: Homemaker. Substance history: negative for alcohol, tobacco and street drugs. Positive for nothing. Exposure history: There are no indoor cat/s in the home. The patient was instructed to not change the cat litter. She denies close contact with children on a regular basis. She has had chicken pox or the vaccine in the past.   Patient denies issues with domestic violence. Genetic Screening/Teratology Counseling: (Includes patient, baby's father, or anyone in either family with:)  3.  Patient's age >/= 28 at [facility]?-- no  .   2.   Thalassemia ([facility], [facility], [address], or  background): MCV<80?--no.     3.  Neural tube defect (meningomyelocele, spina bifida, anencephaly)?--no.   4.  Congenital heart defect?--no.  5.  Down syndrome?--no.   6.  Maurice-Sachs ([place], [facility] [place])?--no.   7.  Canavan's Disease?--no.   8.  Familial Dysautonomia?--no.   9.  Sickle cell disease or trait ()? --no   The patient has not been tested for sickle trait  10. Hemophilia or other blood disorders?--no. 11.  Muscular dystrophy?--no. 12.  Cystic fibrosis?--no. 13.  Amistad's Chorea?--no. 14.  Mental retardation/autism (if yes was person tested for Fragile X)?--no. 15.  Other inherited genetic or chromosomal disorder?--no. 12.  Maternal metabolic disorder (DM, PKU, etc)?--no. 17.  Patient or FOB with a child with a birth defect not listed above?--no.  17a. Patient or FOB with a birth defect themselves?--no. 18.  Recurrent pregnancy loss, or stillbirth?--no. 19.  Any medications since LMP other than prenatal vitamins (include vitamins, supplements, OTC meds, drugs, alcohol)?--no. 20.  Any other genetic/environmental exposure to discuss?--no. Infection History:  1. Lives with someone with TB or TB exposed?--no.   2.  Patient or partner has history of genital herpes?--no.  3.  Rash or viral illness since LMP?--no.    4.  History of STD (GC, CT, HPV, syphilis, HIV)? --no   5. Other: OTHER? Past Medical History:   Diagnosis Date    Screening for malignant neoplasm of the cervix 6/12/15    Negative (no hpv)      Past Surgical History:   Procedure Laterality Date    HX CHOLECYSTECTOMY      HX WISDOM TEETH EXTRACTION       Social History     Occupational History    Not on file.      Social History Main Topics    Smoking status: Never Smoker    Smokeless tobacco: Never Used      Comment: No vapor or e-cigs     Alcohol use No    Drug use: No    Sexual activity: Yes     Partners: Male     Birth control/ protection: None     Family History   Problem Relation Age of Onset    No Known Problems Other     No Known Problems Mother     No Known Problems Father      OB History    Para Term  AB Living   2 1 1   1   SAB TAB Ectopic Molar Multiple Live Births       0 1      # Outcome Date GA Lbr Walter/2nd Weight Sex Delivery Anes PTL Lv   2 Current            1 Term 12/10/15 38w0d  5 lb 15.1 oz (2.695 kg) F VAGINAL DELI EPIDURAL AN N SARAH        No Known Allergies  Prior to Admission medications    Medication Sig Start Date End Date Taking? Authorizing Provider   prenatal vit37/iron/folic acid (PRENATA PO) Take  by mouth. Yes Historical Provider   folic acid 133 mcg tablet Take 800 mcg by mouth daily. Yes Historical Provider   raNITIdine (ZANTAC) 150 mg tablet Take 1 Tab by mouth two (2) times a day.  2/15/18  Yes Caty Soto MD        Review of Systems: History obtained from the patient  Constitutional: negative for weight loss, fever, night sweats  HEENT: negative for hearing loss, earache, congestion, snoring, sore throat  CV: negative for chest pain, palpitations, edema  Resp: negative for cough, shortness of breath, wheezing  Breast: negative for breast lumps, nipple discharge, galactorrhea  GI: negative for change in bowel habits, abdominal pain, black or bloody stools  : negative for frequency, dysuria, hematuria, vaginal discharge  MSK: negative for back pain, joint pain, muscle pain  Skin: negative for itching, rash, hives  Neuro: negative for dizziness, headache, confusion, weakness  Psych: negative for anxiety, depression, change in mood  Heme/lymph: negative for bleeding, bruising, pallor    Objective:  Visit Vitals    /60    Pulse 84    Ht 5' 1\" (1.549 m)    Wt 137 lb (62.1 kg)    LMP 01/15/2018 (Approximate)    Breastfeeding No    BMI 25.89 kg/m2       Physical Exam:     Constitutional  · Appearance: well-nourished, well developed, alert, in no acute distress    HENT  · Head  · Face: appears normal  · Eyes: appear normal  · Ears: normal  · Mouth: normal  · Lips: no lesions    Neck  · Inspection/Palpation: normal appearance, no masses or tenderness  · Lymph Nodes: no lymphadenopathy present  · Thyroid: gland size normal, nontender, no nodules or masses present on palpation    Chest  · Respiratory Effort: breathing unlabored  · Auscultation: normal breath sounds    Cardiovascular  · Heart:  · Auscultation: regular rate and rhythm without murmur    Breasts  · Inspection of Breasts: breasts symmetrical, no skin changes, no discharge present, nipple appearance normal, no skin retraction present  · Palpation of Breasts and Axillae: no masses present on palpation, no breast tenderness  · Axillary Lymph Nodes: no lymphadenopathy present    Gastrointestinal  · Abdominal Examination: abdomen non-tender to palpation, normal bowel sounds, no masses present  · Liver and spleen: no hepatomegaly present, spleen not palpable  · Hernias: no hernias identified    Genitourinary  · External Genitalia: normal appearance for age, no discharge present, no tenderness present, no inflammatory lesions present, no masses present, no atrophy present  · Vagina: normal vaginal vault without central or paravaginal defects, no discharge present, no inflammatory lesions present, no masses present  · Bladder: non-tender to palpation  · Urethra: appears normal  · Cervix: normal   · Uterus: enlarged 12-14 weeks, normal shape, soft  · Adnexa: no adnexal tenderness present, no adnexal masses present  · Perineum: perineum within normal limits, no evidence of trauma, no rashes or skin lesions present  · Anus: anus within normal limits, no hemorrhoids present  · Inguinal Lymph Nodes: no lymphadenopathy present    Skin  · General Inspection: no rash, no lesions identified    Neurologic/Psychiatric  · Mental Status:  · Orientation: grossly oriented to person, place and time  · Mood and Affect: mood normal, affect appropriate    Assessment: Intrauterine pregnancy:  - unsure LMP, missed US appt today. Rescheduled for next week  - NOB labs today  - considering Panorama - will draw at appt next wk if she wants done  - CF 97 neg last preg    Plan:     · Offered Nuchal Translucency, MSAFP, amnio, and discussed NIPT  · Course of pregnancy discussed including visit schedule, routine U/S, glucola testing, etc.  · Avoid alcoholic beverages and illicit/recreational drugs use  · Take prenatal vitamins or folic acid daily. · Hospital and practice style discussed with coverage system. · Discussed nutrition, toxoplasmosis precautions, sexual activity, exercise, need for influenza vaccine, environmental and work hazards, travel advice, screen for domestic violence, need for seat belts. · Discussed seafood, unpasteurized dairy products, deli meat, artificial sweeteners, and caffeine. · Information on prenatal classes/breastfeeding given. · Patient encouraged not to smoke. · Discussed current prescription drug use. Given medication list.  · Discussed the use of over the counter medications and chemicals. ·   · Pt understands risk of hemorrhage during pregnancy and post delivery and would accept blood products if necessary in life-threatening emergencies    Handouts given to pt. Orders Placed This Encounter    CULTURE, URINE    CT/NG/T.VAGINALIS AMPLIFICATION     Order Specific Question:   Specimen type     Answer:   Vaginal [516]    HEP B SURFACE AG    HIV SCREEN, [facility]. W/REFLEX CONFIRM    CBC W/O DIFF    RUBELLA AB, IGG    T PALLIDUM SCREEN W/REFLEX    URINALYSIS W/ RFLX MICROSCOPIC    TYPE, ABO & RH    ANTIBODY SCREEN    prenatal vit37/iron/folic acid (PRENATA PO)     Sig: Take  by mouth.  folic acid 518 mcg tablet     Sig: Take 800 mcg by mouth daily.     PAP, IG, RFX HPV ASCUS ([id number])

## 2018-05-14 LAB
APPEARANCE UR: CLEAR
BILIRUB UR QL STRIP: NEGATIVE
C TRACH RRNA SPEC QL NAA+PROBE: NEGATIVE
COLOR UR: YELLOW
GLUCOSE UR QL: NEGATIVE
HGB UR QL STRIP: NEGATIVE
KETONES UR QL STRIP: ABNORMAL
LEUKOCYTE ESTERASE UR QL STRIP: NEGATIVE
MICRO URNS: ABNORMAL
N GONORRHOEA RRNA SPEC QL NAA+PROBE: NEGATIVE
NITRITE UR QL STRIP: NEGATIVE
PH UR STRIP: 5.5 [PH] (ref 5–7.5)
PROT UR QL STRIP: NEGATIVE
SP GR UR: 1.03 (ref 1–1.03)
T VAGINALIS RRNA SPEC QL NAA+PROBE: NEGATIVE
UROBILINOGEN UR STRIP-MCNC: 0.2 MG/DL (ref 0.2–1)

## 2018-05-15 LAB
ABO GROUP BLD: NORMAL
BACTERIA UR CULT: NORMAL
BACTERIA UR CULT: NORMAL
BLD GP AB SCN SERPL QL: NEGATIVE
CYTOLOGIST CVX/VAG CYTO: NORMAL
CYTOLOGY CVX/VAG DOC THIN PREP: NORMAL
DX ICD CODE: NORMAL
ERYTHROCYTE [DISTWIDTH] IN BLOOD BY AUTOMATED COUNT: 12.8 % (ref 12.3–15.4)
HBV SURFACE AG SERPL QL IA: NEGATIVE
HCT VFR BLD AUTO: 34.8 % (ref 34–46.6)
HGB BLD-MCNC: 11.7 G/DL (ref 11.1–15.9)
HIV 1+2 AB+HIV1 P24 AG SERPL QL IA: NON REACTIVE
LABCORP, 190119: NORMAL
Lab: NORMAL
MCH RBC QN AUTO: 29.5 PG (ref 26.6–33)
MCHC RBC AUTO-ENTMCNC: 33.6 G/DL (ref 31.5–35.7)
MCV RBC AUTO: 88 FL (ref 79–97)
OTHER STN SPEC: NORMAL
PATH REPORT.FINAL DX SPEC: NORMAL
PLATELET # BLD AUTO: 255 X10E3/UL (ref 150–379)
RBC # BLD AUTO: 3.96 X10E6/UL (ref 3.77–5.28)
RH BLD: POSITIVE
RUBV IGG SERPL IA-ACNC: 3.89 INDEX
STAT OF ADQ CVX/VAG CYTO-IMP: NORMAL
T PALLIDUM AB SER QL IA: NEGATIVE
WBC # BLD AUTO: 7.2 X10E3/UL (ref 3.4–10.8)

## 2018-05-18 ENCOUNTER — ROUTINE PRENATAL (OUTPATIENT)
Dept: OBGYN CLINIC | Age: 28
End: 2018-05-18

## 2018-05-18 VITALS
HEART RATE: 55 BPM | DIASTOLIC BLOOD PRESSURE: 69 MMHG | SYSTOLIC BLOOD PRESSURE: 90 MMHG | BODY MASS INDEX: 25.86 KG/M2 | HEIGHT: 61 IN | WEIGHT: 137 LBS

## 2018-05-18 DIAGNOSIS — Z34.82 ENCOUNTER FOR SUPERVISION OF OTHER NORMAL PREGNANCY IN SECOND TRIMESTER: Primary | ICD-10-CM

## 2018-05-18 PROBLEM — Z34.90 PREGNANCY: Status: ACTIVE | Noted: 2018-05-18

## 2018-05-18 LAB — AFPT, MATERNAL, EXTERNAL: NORMAL

## 2018-05-18 NOTE — MR AVS SNAPSHOT
[address] [place] [address] [address]  [address] 5200 [facility] 
[phone] Patient: Mignon Montoya MRN: [id number] OZY:7/73/[birth year]21 Visit Information Marti Thapa Personal Médico Departamento Teléfono del Dep. Número de visita 5/18/2018  9:40 AM Judith Foreman, [facility] [phone] Upcoming Health Maintenance Date Due Influenza Age 5 to Adult 8/1/2018 PAP AKA CERVICAL CYTOLOGY 5/11/2021 Alergias  Review Complete El: 5/18/2018 Por: Segundo Pedro A partir del:  5/18/2018 No Known Allergies Vacunas actuales Revisadas el:  12/9/2015 Jun Zuñiga Influenza Vaccine Ana Maria Cortes) 9/29/2015 Tdap 10/27/2015  2:02 PM  
  
 No revisadas esta visita Partes vitales PS Pulso Garner ( percentil de crecimiento) Peso (percentil de crecimiento) LMP (última hailey) Está amamantando? 90/69 (!) 55 5' 1\" (1.549 m) 137 lb (62.1 kg) 01/15/2018 (Approximate) No  
 BMI ([facility] Estado obstétrico Estatus de tabaquísmo 25.89 kg/m2 Pregnant Never Smoker BMI and BSA Data Body Mass Index Body Surface Area  
 25.89 kg/m 2 1.63 m 2 [facility] Name Phone Sandra Sterling [address], [address] [phone] Bocanegra lista de medicamentos actualizada Yen Coughlin actualizada 5/18/18 10:19 AM.  Shweta Pacheco use bocanegra lista de medicamentos más reciente. folic acid 161 mcg tablet Take 800 mcg by mouth daily. PRENATA PO Take  by mouth. raNITIdine 150 mg tablet También conocido jorje:  ZANTAC Take 1 Tab by mouth two (2) times a day. Instrucciones para el Paciente MyChart Help Desk: [phone] Semanas 14 a 18 de bocanegra embarazo: Instrucciones de cuidado - [ Karen Grapes 14 to 25 of Your Pregnancy: Care Instructions ] Instrucciones de cuidado Ivan [employer], es posible que se le empiece a notar que está [place]. También podría observar algunos cambios en la piel, jorje picazón en algunas zonas de las debi de las ana cristina o acné en la farrah. Sophronia Slough, bocanegra bebé puede orinar y lisette primeras heces (meconio) comienzan a acumularse en el intestino. Greyson Pressman a crecerle el smooth en la andres. En bocanegra próxima visita, [employer] 18 y 21, bocanegra médico podría hacerle eun ecografía. La prueba le permite al médico verificar si hay ciertos problemas. Bocanegra médico también puede determinar el sexo de bocanegra bebé. Rosaline es un buen momento para pensar si Umm Berger si bocanegra bebé es Gerhardt Heal. Hable con bocanegra médico acerca de ponerse la vacuna contra la gripe para ayudar a mantenerse petra ivan el embarazo. Con el transcurrir del [facility], es común sentirse preocupada o ansiosa. Bocanegra cuerpo está [employer]. Y usted está pensando en sri a lula, en la phoenix de bocanegra bebé y en convertirse en madre. Puede aprender a sobrellevar la ansiedad y el estrés que siente. La atención de seguimiento es eun parte clave de bocanegra tratamiento y seguridad. Asegúrese de hacer y acudir a todas las citas, y llame a bocanegra médico si está teniendo problemas. También es eun buena idea saber los resultados de los exámenes y mantener eun lista de los medicamentos que geo. Cómo puede cuidarse en el hogar? ?Plascencia Finn ? · Pida ayuda para cocinar y [employer]. ? · Entienda quién o qué le provoca estrés. Evite a estas personas o situaciones tanto jorje le sea posible. ? · Relájese todos los días. Tomarse descansos de 10 a 15 minutos puede hacerle sentir eun gran diferencia. Camine, escuche música o tome un baño tibio. ? · Tucker clases de yoga o educación prenatal para aprender técnicas de relajación. También puede comprar un disco compacto de relajación. ? · Med[employer]onic lista de lisette temores acerca de tener el bebé y ser [place]. Comparta la lista con alguien de ragsdale confianza. Aric Son inquietudes son verdaderamente pequeñas, y trate de deshacerse de ellas. Ejercicio ? · Si no hizo mucho ejercicio antes del embarazo, comience poco a poco. Lo mejor es caminar. Regule ragsdale ritmo y christopher un poco más cada día. ? · La caminata rápida, el trote lento, los ejercicios aeróbicos de bajo impacto, los ejercicios aeróbicos en el agua y el yoga son Annette Chimes opciones. Algunos deportes, jorje el buceo, la equitación, el esquí Radha, la gimnasia y el esquí acuático no son Fredi Plant idea. ? · Trate de hacer por lo menos 2½ horas de ejercicio moderado a la semana, jorje, por ejemplo, eun caminata rápida. Rumalda Lowers de hacer esto es estar activo 30 minutos al día, por lo menos 5 días de la [place]. Está jessica estar activo en bloques de 10 minutos o más chang el día y la [place]. ? · Use ropa holgada. Use zapatos y un sostén que le proporcionen un buen soporte. ? · Eliot Herrlich de calentamiento y enfriamiento para comenzar y finalizar lisette ejercicios. ? · Si desea usar pesas, asegúrese de que tiana livianas. Estas reducen la tensión en las articulaciones. ?Manténgase en el peso ideal para usted ? · Los expertos recomiendan el aumento de 1 eleazar (medio kilo) al [employer] chang los 3 primeros meses del [facility]. ? · También recomiendan aumentar 1 eleazar a la [employer] 6 últimos meses del [facility], para aumentar de 25 a 35 libras (11 a 16 kg) en total.  
? · Si está por debajo del peso recomendable para usted, necesitará aumentar más, de 28 a 40 libras (13 a 18 kg) aproximadamente. ? · Si tiene sobrepeso, quizás no deba aumentar tanto de [place], de 15 a 25 libras (7 a 11 kg) aproximadamente. ? · Si está subiendo de Tono Roper, use ragsdale sentido común. Eliot Wright Tenet Mimet[employer], y [place], la comida rápida y [place]. Sue Manifold, frutas y verduras. ? · Si va a tener gemelos o más bebés, es posible que ragsdale médico la remita a un dietista. Dónde puede encontrar más información en inglés? Elder Slimmer a http://all-yolis.info/. Elo Records L069 en la búsqueda para aprender más acerca de \"Semanas 14 a 18 de ragsdale embarazo: Instrucciones de cuidado - [ Raenette Loges 14 to 25 of Your Pregnancy: Care Instructions ]. \" 
Revisado: 16 marzo, 2017 Versión del contenido: 11.4 © 1619-8775 Healthwise, Incorporated. Las instrucciones de cuidado fueron adaptadas bajo licencia por Good Help Connections (which disclaims liability or warranty for this information). Si usted tiene [place] [facility] afección médica o sobre estas instrucciones, siempre pregunte a ragsdale profesional de phoenix. Healthwise, Incorporated niega toda garantía o responsabilidad por ragsdale uso de esta información. Introducing [facility] HEALTH SERVICES! [facility] introduce portal paciente SentinelOnehart . Ahora se puede acceder a partes de ragsdale expediente médico, enviar por correo electrónico la oficina de ragsdale médico y solicitar renovaciones de medicamentos en línea. En ragsdale navegador de Internet , Marjorie Anderson a [email or web address] Christopher clic en el usuario por Butch Post? Darreld Flood clic aquí en la sesión [place]. Verá la página de registro [place]. Ingrese ragsdale código de Bank [employer]a july y jorje aparece a continuación. Usted no tendrá que [employer] código después de osbaldo completado el proceso de registro . Si usted no se inscribe antes de la fecha de caducidad , debe solicitar un nuevo código. · MyChart Código de acceso : [id number] Expires: 8/9/2018 10:41 AM 
 
Ingresa los últimos cuatro dígitos de ragsdale Número de Seguro Social ( xxxx ) y fecha de nacimiento ( dd / mm / aaaa ) jorje se indica y christopher clic en Enviar. Usted será llevado a la siguiente página de registro . Crear un ID MyChart .  Esta será ragsdale ID de inicio de sesión de MyChart y no puede ser [place] , por lo que pensar en eun que es negrete y fácil de recordar . Crear eun contraseña MyChart . Usted puede cambiar ragsdale contraseña en cualquier momento . Ingrese ragsdale Password Reset de preguntas y Mcgraw . [place] se puede utilizar en un momento posterior si usted olvida ragsdale contraseña. Introduzca ragsdale dirección de correo electrónico . Lana Quale recibirá eun notificación por correo electrónico cuando la nueva información está disponible en MyChart . Lilian Pedrito price en Registrarse. Eunice Vallejo javi y descargar porciones de ragsdale expediente médico. 
Aby clic en el enlace de descarga del menú Resumen para descargar eun copia portátil de ragsdale información médica . Si tiene [employer] , por favor visite la sección de preguntas frecuentes del sitio web MyChart . Recuerde, MyChart NO es que se utilizará para las necesidades urgentes. Para emergencias médicas , llame al 911 . Ahora disponible en ragsdale iPhone y Android ! Por favor proporcione hazel resumen de la documentación de cuidado a ragsdale próximo proveedor. Your primary care clinician is listed as Palmira Wall. If you have any questions after today's visit, please call [phone].

## 2018-05-18 NOTE — PATIENT INSTRUCTIONS
[facility] [phone]       Semanas 14 a 18 de ragsdale embarazo: Instrucciones de cuidado - [ [place] 14 to 25 of Your Pregnancy: Care Instructions ]  Instrucciones de cuidado    Fogd Drejers [place] 93, es posible que se le empiece a notar que está [place]. También podría observar algunos cambios en la piel, jorje picazón en algunas zonas de las debi de las ana cristina o acné en la farrah. Darcella Dragon, ragsdale bebé puede orinar y lisette primeras heces (meconio) comienzan a acumularse en el intestino. Callie Cocking a crecerle el smooth en la andres. En ragsdale próxima visita, [employer] 18 y 21, ragsdale médico podría hacerle eun ecografía. La prueba le permite al médico verificar si hay ciertos problemas. Ragsdale médico también puede determinar el sexo de ragsdale bebé. Rosaline es un buen momento para pensar si Yvonnie New si ragsdale bebé es Mchugh Sandifer. Hable con ragsdale médico acerca de ponerse la vacuna contra la gripe para ayudar a mantenerse petra chang el embarazo. Con el transcurrir del [facility], es común sentirse preocupada o ansiosa. Ragsdale cuerpo está [employer]. Y usted está pensando en sri a lula, en la phoenix de ragsdale bebé y en convertirse en madre. Puede aprender a sobrellevar la ansiedad y el estrés que siente. La atención de seguimiento es eun parte clave de ragsdale tratamiento y seguridad. Asegúrese de hacer y acudir a todas las citas, y llame a ragsdale médico si está teniendo problemas. También es eun buena idea saber los resultados de los exámenes y mantener eun lista de los medicamentos que geo. ¿Cómo puede cuidarse en el hogar? ?Mallie [place]  ? · Pida ayuda para cocinar y [employer]. ? · Entienda quién o qué le provoca estrés. Evite a estas personas o situaciones tanto jorje le sea posible. ? · Relájese todos los días. Tomarse descansos de 10 a 15 minutos puede hacerle sentir eun gran diferencia. Camine, escuche música o tome un baño tibio.    ? · [place] clases de yoga o educación prenatal para aprender técnicas de relajación. También puede comprar un disco compacto de relajación. ? · Med[employer]onic lista de lisette temores acerca de tener el bebé y ser [place]. Comparta la lista con alguien de ragsdale confianza. Clemetine Cocks inquietudes son verdaderamente pequeñas, y trate de deshacerse de ellas. Ejercicio  ? · Si no hizo mucho ejercicio antes del embarazo, comience poco a poco. Lo mejor es caminar. Regule ragsdale ritmo y christopher un poco más cada día. ? · La caminata rápida, el trote lento, los ejercicios aeróbicos de bajo impacto, los ejercicios aeróbicos en el agua y el yoga son Prudy Glimpse opciones. Algunos deportes, jorje el buceo, la equitación, el esquí Radha, la gimnasia y el esquí acuático no son Suzanne Mons idea. ? · Trate de hacer por lo menos 2½ horas de ejercicio moderado a la semana, jorje, por ejemplo, eun caminata rápida. Anna Osler de hacer esto es estar activo 30 minutos al día, por lo menos 5 días de la [place]. Está jessica estar activo en bloques de 10 minutos o más chang el día y la [place]. ? · Use ropa holgada. Use zapatos y un sostén que le proporcionen un buen soporte. ? · Caye Bur de calentamiento y enfriamiento para comenzar y finalizar lisette ejercicios. ? · Si desea usar pesas, asegúrese de que tiana livianas. Estas reducen la tensión en las articulaciones. ?Manténgase en el peso ideal para usted  ? · Los expertos recomiendan el aumento de 1 eleazar (medio kilo) al [employer] chang los 3 primeros meses del Lujan Blade. ? · También recomiendan aumentar 1 eleazar a la [employer] 6 últimos meses del Lujan Blade, para aumentar de 25 a 35 libras (11 a 16 kg) en total.   ? · Si está por debajo del peso recomendable para usted, necesitará aumentar más, de 28 a 40 libras (13 a 18 kg) aproximadamente. ? · Si tiene sobrepeso, quizás no deba aumentar tanto de [place], de 15 a 25 libras (7 a 11 kg) aproximadamente. ? · Si está subiendo de Tono Roper, use ragsdale sentido común.  Victor Hugo Moore [facility], y [employer] dulces, la comida rápida y [place]. Charlotta Ro, frutas y verduras. ? · Si va a tener gemelos o más bebés, es posible que ragsdale médico la remita a un dietista. ¿Dónde puede encontrar más información en inglés? Ann Schaffer a http://all-yolis.info/. Chris Hanson A847 en la búsqueda para aprender más acerca de \"Semanas 14 a 18 de ragsdale embarazo: Instrucciones de cuidado - [ [place]  14 to 25 of Your Pregnancy: Care Instructions ]. \"  Revisado: 16 marzo, 2017  Versión del contenido: 11.4  © 2595-8487 Healthwise, Incorporated. Las instrucciones de cuidado fueron adaptadas bajo licencia por Good Help Connections (which disclaims liability or warranty for this information). Si usted tiene [place] afección médica o sobre estas instrucciones, siempre pregunte a ragsdale profesional de phoenix. [employer], [employer] niega toda garantía o responsabilidad por ragsdale uso de esta información.

## 2018-05-18 NOTE — PROGRESS NOTES
TA ULTRASOUND PERFORMED/LPNC  A SINGLE VIABLE 16W3D IUP IS SEEN WITH NORMAL CARDIAC RHYTHM. GESTATIONAL AGE BASED ON TODAYS ULTRASOUND. AN ANTERIOR PLACENTA IS SEEN AND WNL. Unsure LMP. US for datin+3, ALISSA=10/30/18. Had syncopal episode - felt weak, blacked out, unwitnessed, no sure how long. Did not have SOB, CP, palpitations. Will have some SOB if walking long distance. Adv 64oz H2O, call if recurs or dev oth sx. QS today. RTO 4wks with US.

## 2018-05-23 LAB
2ND TRIMESTER 4 SCREEN SERPL-IMP: NORMAL
2ND TRIMESTER 4 SCREEN SERPL-IMP: NORMAL
AFP ADJ MOM SERPL: 0.75
AFP SERPL-MCNC: 27.1 NG/ML
AGE AT DELIVERY: 29.7 YR
COMMENTS, 018014: NORMAL
FET TS 18 RISK FROM MAT AGE: NORMAL
FET TS 21 RISK FROM MAT AGE: 724
GA METHOD: NORMAL
GA: 16.3 WEEKS
HCG ADJ MOM SERPL: 0.71
HCG SERPL-ACNC: NORMAL MIU/ML
IDDM PATIENT QL: NO
INHIBIN A ADJ MOM SERPL: 0.76
INHIBIN A SERPL-MCNC: 135.22 PG/ML
MULTIPLE PREGNANCY: NO
NEURAL TUBE DEFECT RISK FETUS: NORMAL %
RESULTS, 017389: NORMAL
TS 18 RISK FETUS: NORMAL
TS 21 RISK FETUS: NORMAL
U ESTRIOL ADJ MOM SERPL: 1.66
U ESTRIOL SERPL-MCNC: 1.41 NG/ML

## 2018-06-11 ENCOUNTER — ROUTINE PRENATAL (OUTPATIENT)
Dept: OBGYN CLINIC | Age: 28
End: 2018-06-11

## 2018-06-11 VITALS
BODY MASS INDEX: 26.81 KG/M2 | WEIGHT: 142 LBS | SYSTOLIC BLOOD PRESSURE: 102 MMHG | DIASTOLIC BLOOD PRESSURE: 64 MMHG | HEIGHT: 61 IN | HEART RATE: 92 BPM

## 2018-06-11 DIAGNOSIS — Z3A.19 19 WEEKS GESTATION OF PREGNANCY: ICD-10-CM

## 2018-06-11 NOTE — PROGRESS NOTES
+FM. QS low risk. US today with isolated CP cyst -> will refer to MFM. Vag d/c a little thicker, no other sx. RTO 4wks.

## 2018-06-11 NOTE — MR AVS SNAPSHOT
[address] [facility] [address] 1007 [facility] 
[phone] Patient: Mariely Hammond MRN: [id number] DYX:[birth year] Visit Information Drena Pain y [place] Personal Médico Departamento Teléfono del Dep. Número de visita 6/11/2018  3:00  S Kassie Ferguson, [address] [phone]  
  
 7/10/2018  1:30 PM  
OB VISIT with [address], MD  
[place] [facility] Appt Note: 24w0d - 1hr GCT/CBC today (MG)  
 [id number] [facility] [address] [facility] 99 [phone]  
[facility] [address] [place] [address] Upcoming Health Maintenance Date Due Influenza Age 5 to Adult 8/1/2018 PAP AKA CERVICAL CYTOLOGY 5/11/2021 Alergias  Review Complete El: 6/11/2018 Por: Emory Craig A partir del:  6/11/2018 No Known Allergies Vacunas actuales Revisadas el:  12/9/2015 Yumi Fishman Influenza Vaccine Odshelbi Acosta) 9/29/2015 Tdap 10/27/2015  2:02 PM  
  
 No revisadas esta visita Partes vitales PS Pulso [place] ( percentil de crecimiento) Peso (percentil de crecimiento) LMP (última haliey) Está amamantando?  
 102/64 92 5' 1\" (1.549 m) 142 lb (64.4 kg) 01/15/2018 (Approximate) No  
 BMI ([facility] Estado obstétrico Estatus de tabaquísmo 26.83 kg/m2 Pregnant Never Smoker BMI and BSA Data Body Mass Index Body Surface Area  
 26.83 kg/m 2 1.66 m 2 [facility] Name Phone Alondra Calhoun [address], [address] [phone] Bocanegra lista de medicamentos actualizada Johanny Davies actualizada 6/11/18  3:07 PM.  Meryl Darrion use bocanegra lista de medicamentos más reciente. folic acid 794 mcg tablet Take 800 mcg by mouth daily. PRENATA PO Take  by mouth. raNITIdine 150 mg tablet También conocido jorje:  ZANTAC Take 1 Tab by mouth two (2) times a day. Instrucciones para el Paciente MyChart Help Desk: [phone] Aprenda cuándo llamar a ragsdale médico chang el embarazo (después de 20 semanas) - [ Learning About When to Call Your Doctor During Pregnancy (After 20 Weeks) ] Instrucciones de cuidado Es normal que tenga inquietudes acerca de lo que podría ser un problema chang el [place]. Aunque la mayoría de las mujeres embarazadas no tienen ningún problema grave, es importante saber cuándo llamar a ragsdale médico si tiene determinados síntomas o señales de trabajo de [place]. Estas son algunas sugerencias generales. Ragsdale médico puede darle más información sobre cuándo llamar. Cuándo llamar a ragsdale médico (después de 20 semanas) Llame al 911 en cualquier momento que sospeche que puede necesitar atención de [place]. Por ejemplo, llame si: · Tiene sangrado vaginal intenso. · Tiene dolor repentino e intenso en el abdomen. · Se desmayó (perdió el conocimiento). · Tiene eun convulsión. · Ve o siente el cordón umbilical. 
· Teri que está a punto de sri a lula a ragsdale bebé y no puede llegar en forma negrete al hospital. 
Benita Alcantara a ragsdale médico ahora mismo o busque atención médica inmediata si: · Tiene sangrado vaginal. 
· Tiene dolor en el abdomen. · Tiene fiebre. · Tiene síntomas de preeclampsia, tales jorje: 
¨ Hinchazón repentina de la farrah, las ana cristina o los pies. ¨ Problemas nuevos con la visión (jorje oscurecimiento de la visión o visión borrosa). ¨ Dolor de andres intenso. · Tiene eun pérdida repentina de líquido por la vagina. (Piensa que rompió la dacia). · Teri que puede estar en Flateyri. [place] significa que usted ha tenido al menos 4 contracciones en 20 minutos o al menos 8 contracciones en [employer]. · Nota que ragsdale bebé ha dejado de moverse o lo hace mucho menos de lo habitual. 
· Tiene síntomas de eun infección del tracto urinario. Estos pueden incluir: ¨ Dolor o ardor al orinar. ¨ Necesidad de orinar con frecuencia sin poder eliminar mucha orina. ¨ Dolor en el flanco, que se encuentra jorge debajo de la caja torácica y [place] de la cintura en un lado de la espalda. ¨ Bandar en la orina. Preste especial atención a los cambios en ragsdale phoenix y asegúrese de comunicarse con ragsdale médico si: · Tiene flujo vaginal con un olor desagradable. · Tiene cambios en la piel, tales jorje: 
¨ Salpullido. ¨ Comezón. ¨ Color amarillento en la piel. · Tiene otras inquietudes acerca de ragsdale embarazo. Si tiene signos de trabajo de parto al llegar a las [address] Si tiene señales de Viechkamilah Castro a las [address] [facility] o [address] [facility], es posible que ragsdale médico le diga que llame cuando ragsdale trabajo de parto se vuelva [facility]. Los síntomas del trabajo de parto activo incluyen: · Contracciones que son regulares. · Contracciones a intervalos de menos de 5 minutos. · Contracciones chang las cuales es difícil hablar. La atención de seguimiento es eun parte clave de ragsdale tratamiento y seguridad. Asegúrese de hacer y acudir a todas las citas, y llame a ragsdale médico si está teniendo problemas. También es eun buena idea saber los resultados de los exámenes y mantener eun lista de los medicamentos que geo. Dónde puede encontrar más información en inglés? Jarad Thurston a http://all-yolis.info/. Escriba N797 en la búsqueda para aprender más acerca de \"Aprenda cuándo llamar a ragsdale médico chang el embarazo (después de 20 semanas) - [ Learning About When to Call Your Doctor During Pregnancy (After 20 Weeks) ]. \" 
Revisado: 16 marzo, 2017 Versión del contenido: 11.4 © 9142-9935 Healthwise, Survature. Las instrucciones de cuidado fueron adaptadas bajo licencia por Good Help Connections (which disclaims liability or warranty for this information).  Si usted tiene [place] afección médica o sobre estas instrucciones, siempre pregunte a ragsdale profesional de phoenix. [facility]orporated niega toda garantía o responsabilidad por ragsdale uso de esta información. Introducing [facility] SERVICES! [facility] introduce portal paciente MyChart . Ahora se puede acceder a partes de ragsdale expediente médico, enviar por correo electrónico la oficina de ragsdale médico y solicitar renovaciones de medicamentos en línea. En ragsdale navegador de Internet , Kamala Jordanume a [email or web address] Christopher clic en el usuario por Maliha Bean? Nonda Slates clic aquí en la sesión [facility]. Verá la página de registro [place]. Ingrese ragsdale código de [employer] july y jorje aparece a continuación. Usted no tendrá que [employer]um[employer]ident código después de osbaldo completado el proceso de registro . Si usted no se inscribe antes de la fecha de caducidad , debe solicitar un nuevo código. · MyChart Código de acceso : [id number] Expires: 8/9/2018 10:41 AM 
 
Ingresa los últimos cuatro dígitos de ragsdale Número de Seguro Social ( xxxx ) y fecha de nacimiento ( dd / mm / aaaa ) jorje se indica y christopher clic en Enviar. Usted será llevado a la siguiente página de registro . Crear un ID MyChart . Esta será ragsdale ID de inicio de sesión de MyChart y no puede ser [place] , por lo que pensar en eun que es Deangelo Coil y fácil de recordar . Crear eun contraseña MyChart . Usted puede cambiar ragsdale contraseña en cualquier momento . Ingrese ragsdale Password Reset de preguntas y [place] . [place] se puede utilizar en un momento posterior si usted olvida ragsdale contraseña. Introduzca ragsdale dirección de correo electrónico . Hever Carrero recibirá eun notificación por correo electrónico cuando la nueva información está disponible en MyChart . Henrietta Urban clic en Registrarse. Kemi Coho javi y descargar porciones de ragsdale expediente médico. 
Christopher clic en el enlace de descarga del menú Resumen para descargar eun copia portátil de ragsdale información médica . Si tiene [employer] , por favor visite la sección de preguntas frecuentes del sitio web MyChart . Recuerde, MyChart NO es que se utilizará para las necesidades urgentes. Para emergencias médicas , llame al 911 . Ahora disponible en ragsdale iPhone y Android ! Por favor proporcione hazel resumen de la documentación de cuidado a ragsdale próximo proveedor. Your primary care clinician is listed as Clemencia Zaragoza. If you have any questions after today's visit, please call [phone].

## 2018-06-11 NOTE — PATIENT INSTRUCTIONS
[facility]Chart Help [facility]: [phone]       Aprenda cuándo llamar a ragsdale médico chang el embarazo (después de 20 semanas) - [ Learning About When to Call Your Doctor During Pregnancy (After 20 Weeks) ]  Instrucciones de cuidado  Es normal que tenga inquietudes acerca de lo que podría ser un problema chang el [facility]. Aunque la mayoría de las mujeres embarazadas no tienen ningún problema grave, es importante saber cuándo llamar a ragsdale médico si tiene determinados síntomas o señales de trabajo de [employer]. Estas son algunas sugerencias generales. Ragsdale médico puede darle más información sobre cuándo llamar. Cuándo llamar a ragsdale médico (después de 20 semanas)  Llame al 911 en cualquier momento que sospeche que puede necesitar atención de [place]. Por ejemplo, llame si:  · Tiene sangrado vaginal intenso. · Tiene dolor repentino e intenso en el abdomen. · Se desmayó (perdió el conocimiento). · Tiene eun convulsión. · Ve o siente el cordón umbilical.  · Teri que está a punto de sri a lula a ragsdale bebé y no puede llegar en forma negrete al hospital.  [place]an [place] a ragsdale médico ahora mismo o busque atención médica inmediata si:  · Tiene sangrado vaginal.  · Tiene dolor en el abdomen. · Tiene fiebre. · Tiene síntomas de preeclampsia, tales jorje:  ¨ Hinchazón repentina de la farrah, las ana cristina o los pies. ¨ Problemas nuevos con la visión (jorje oscurecimiento de la visión o visión borrosa). ¨ Dolor de andres intenso. · Tiene eun pérdida repentina de líquido por la vagina. (Piensa que rompió la dacia). · Teri que puede estar en Nilam Clock. [place] significa que usted ha tenido al menos 4 contracciones en 20 minutos o al menos 8 contracciones en Miriam Rink. · Nota que ragsdale bebé ha dejado de moverse o lo hace mucho menos de lo habitual.  · Tiene síntomas de eun infección del tracto urinario. Estos pueden incluir:  ¨ Dolor o ardor al orinar. ¨ Necesidad de orinar con frecuencia sin poder eliminar mucha orina.   ¨ Dolor en el flanco, que se encuentra jorge debajo de la caja torácica y [place] de la cintura en un lado de la espalda. ¨ Bandar en la orina. Preste especial atención a los cambios en ragsdale [place] y asegúrese de comunicarse con ragsdale médico si:  · Tiene flujo vaginal con un olor desagradable. · Tiene cambios en la piel, tales jorje:  ¨ Salpullido. ¨ Comezón. ¨ Color amarillento en la piel. · Tiene otras inquietudes acerca de ragsdale embarazo. Si tiene signos de trabajo de parto al llegar a las [address] o más  Si tiene señales de [place] de parto a las 37 semanas o [place], es posible que ragsdale médico le diga que llame cuando ragsdale trabajo de parto se vuelva más Jm. Los síntomas del trabajo de parto activo incluyen:  · Contracciones que son regulares. · Contracciones a intervalos de menos de 5 minutos. · Contracciones chang las cuales es difícil hablar. La atención de seguimiento es eun parte clave de ragsdale tratamiento y seguridad. Asegúrese de hacer y acudir a todas las citas, y llame a ragsdale médico si está teniendo problemas. También es eun buena idea saber los resultados de los exámenes y mantener eun lista de los medicamentos que geo. ¿Dónde puede encontrar más información en inglés? Daneil Goff a http://all-yolis.info/. Escriba R211 en la búsqueda para aprender más acerca de \"Aprenda cuándo llamar a ragsdale médico chang el embarazo (después de 20 semanas) - [ Learning About When to Call Your Doctor During Pregnancy (After 20 Weeks) ]. \"  Revisado: 16 marzo, 2017  Versión del contenido: 11.4  © 2730-6128 Healthwise, Incorporated. Las instrucciones de cuidado fueron adaptadas bajo licencia por Good [facility] Connections (which disclaims liability or warranty for this information). Si usted tiene [place] afección médica o sobre estas instrucciones, siempre pregunte a ragsdale profesional de [place]. [employer] niega toda garantía o responsabilidad por ragsdale uso de esta información.

## 2018-07-10 ENCOUNTER — ROUTINE PRENATAL (OUTPATIENT)
Dept: OBGYN CLINIC | Age: 28
End: 2018-07-10

## 2018-07-10 ENCOUNTER — HOSPITAL ENCOUNTER (OUTPATIENT)
Dept: PERINATAL CARE | Age: 28
Discharge: HOME | End: 2018-07-10
Attending: OBSTETRICS & GYNECOLOGY
Payer: MEDICAID

## 2018-07-10 VITALS
HEIGHT: 61 IN | SYSTOLIC BLOOD PRESSURE: 95 MMHG | WEIGHT: 146 LBS | BODY MASS INDEX: 27.56 KG/M2 | HEART RATE: 93 BPM | DIASTOLIC BLOOD PRESSURE: 63 MMHG

## 2018-07-10 DIAGNOSIS — Z34.82 ENCOUNTER FOR SUPERVISION OF OTHER NORMAL PREGNANCY IN SECOND TRIMESTER: Primary | ICD-10-CM

## 2018-07-10 DIAGNOSIS — Z3A.24 24 WEEKS GESTATION OF PREGNANCY: ICD-10-CM

## 2018-07-10 LAB
GTT, 1 HR, GLUCOLA, EXTERNAL: 110
HCT, EXTERNAL: 32.9
HGB, EXTERNAL: 10.9
PLATELET CNT,   EXTERNAL: 275

## 2018-07-10 PROCEDURE — 76811 OB US DETAILED SNGL FETUS: CPT | Performed by: OBSTETRICS & GYNECOLOGY

## 2018-07-10 NOTE — MR AVS SNAPSHOT
[address] Radha Moosic [address] [place] [address] 
[phone] Patient: Sanaz Sharma MRN: [id number] ZZH: Visit Information Alejo Quinn Personal Médico Departamento Teléfono del Dep. Número de visita 7/10/2018  1:30  [address] 2014  
  
 7/10/2018  1:30 PM  
OB VISIT with [address], MD  
[place] [facility] Appt Note: 24w0d - 1hr GCT/CBC today (MG); MFM after at 2:15pm  
 [address] [place] [address] [address] [facility] [address]  
[phone]  
  
   
 [address] [place] [address] Road [address] [place] [address]  
  
    
 2018  1:10 PM  
OB VISIT with [address], MD  
[place] ([facility] Appt Note: 28w0d -  consent and offer TDap (MG)  
 [address] [address]  
[phone] Upcoming Health Maintenance Date Due Influenza Age 5 to Adult 2018 PAP AKA CERVICAL CYTOLOGY 2021 Alergias  Review Complete El: 7/10/2018 Por: Eunice Sprawls A partir del:  7/10/2018 No Known Allergies Vacunas actuales Revisadas el:  2015 [place] Influenza Vaccine [facility]) 2015 Tdap 10/27/2015  2:02 PM  
  
 No revisadas esta visita You Were Diagnosed With   
  
 Jason Huitron 24 weeks gestation of pregnancy    -  Primary ICD-10-CM: Z3A.24 
ICD-9-CM: V22.2 Partes vitales PS [place] ( percentil de crecimiento) Peso (percentil de crecimiento) LMP (última hailey) Está amamantando? 95/63 93 5' 1\" (1.549 m) 146 lb (66.2 kg) 01/15/2018 (Approximate) No  
 BMI [place] Estado obstétrico Estatus de tabaquísmo 27.59 kg/m2 Pregnant Never Smoker BMI and BSA Data Body Mass Index Body Surface Area  
 27.59 kg/m 2 1.69 m 2 [facility] Name Phone Natalio Sterling 06, [address] [phone] [place] lista de medicamentos actualizada Lista actualizada 7/10/18  1:03 PM.  David Dubios use bocanegra lista de medicamentos más reciente. folic acid 289 mcg tablet Take 800 mcg by mouth daily. PRENATA PO Take  by mouth. raNITIdine 150 mg tablet También conocido jorje:  ZANTAC Take 1 Tab by mouth two (2) times a day. Hicimos lo siguiente CBC W/O DIFF [14105 CPT(R)] GLUCOSE, GESTATIONAL 1 HR TOLERANCE [73853 CPT(R)] Por hacer 07/10/2018 2:15 PM  
  Appointment with ULTRASOUND 1 SFM at [facility] ([phone]) Instrucciones para el Paciente MyChart Help Desk: [phone] Semanas 22 a 26 de bocanegra embarazo: Instrucciones de cuidado - [ Alberta Rein 22 to 26 of Your Pregnancy: Care Instructions ] Instrucciones de cuidado Al comenzar el 7.º mes de bocanegra embarazo en la semana 32, los pulmones de bocanegra bebé se están volviendo más josé miguel y se están preparando para respirar. Podría notar que bocanegra bebé responde al daniel de bocanegra voz o la de bocanegra gloria. También podría notar que bocanegra bebé se voltea y [place] menos de posición, y se retuerce o sacude más. Por lo general, las sacudidas indican que bocanegra bebé tiene hipo. Tener hipo es totalmente normal y dura poco tiempo. Brian vez sea buena idea pensar en asistir a eun clase de preparación para el parto. Rosaline es también un buen momento para comenzar a pensar si desea que chang el parto le administren un analgésico (medicamento para el dolor). A la mayoría de las mujeres embarazadas les hacen pruebas para la diabetes gestacional entre las semanas 24 y 29. La diabetes gestacional ocurre cuando el nivel de azúcar en la evelio es muy alto chang el [facility]. La prueba es importante, porque usted puede tener diabetes gestacional y no saberlo. Sintia esta enfermedad puede causarle problemas a bocanegra bebé. La atención de seguimiento es eun parte clave de ragsdale tratamiento y seguridad. Asegúrese de hacer y acudir a todas las citas, y llame a ragsdale médico si está teniendo problemas. También es eun buena idea saber los resultados de los exámenes y mantener eun lista de los medicamentos que geo. Cómo puede cuidarse en el hogar? Alivie las molestias de las patadas de ragsdale bebé · Cambie de posición. A veces, hazel cambio hace que ragsdale bebé también cambie de posición. · Respire hondo al mismo tiempo que levanta los brazos sobre ragsdale [place]. Después exhale a medida que baja los brazos. Aby los ejercicios de Kegel para evitar pérdidas de [place] · Puede hacer los ejercicios de Kegel estando toledo o sentada. ¨ Apriete los mismos músculos que usaría para detener el chorro de [place]. El abdomen y los muslos no deben moverse. ¨ Manténgalos apretados chang 3 segundos y, luego, relájelos otros 3 segundos. ¨ Empiece con 3 segundos. Ardeen Ewelina, añada 1 neha cada semana hasta que sea capaz de apretar chang 10 segundos. ¨ Repita el ejercicio entre 10 y 13 veces cada sesión. Aby ghassan o más sesiones cada día. Alivie o reduzca la hinchazón de los pies, los tobillos, las ana cristina y los dedos · Si tiene los dedos hinchados, quítese los [place]. · No coma alimentos con mucha sal, jorje santos fritas. · Coloque lisette pies sobre un banco o un sofá todo el tiempo que le sea posible. Duerma con almohadas debajo de los pies. · No se quede de pie chang mucho tiempo ni use calzado ajustado. · Use medias elásticas de soporte. Dónde puede encontrar más información en inglés? Jossue Imam a http://all-yolis.info/. Escriba G264 en la búsqueda para aprender más acerca de \"Semanas 22 a 26 de ragsdale embarazo: Instrucciones de cuidado - [ Ganesh Bathe 22 to 26 of Your Pregnancy: Care Instructions ]. \" 
Revisado: 16 marzo, 2017 Versión del contenido: 11.4 © 2468-3975 Healthwise, Incorporated.  Las instrucciones de cuidado fueron adaptadas bajo licencia por Good [place] Connections (which disclaims liability or warranty for this information). Si usted tiene [place] afección médica o sobre estas instrucciones, siempre pregunte a ragsdale profesional de [place]. [facility], Incorporated niega toda garantía o responsabilidad por ragsdale uso de esta información. Introducing [facility]! [facility] introduce portal paciente MyChart . Ahora se puede acceder a partes de ragsdale expediente médico, enviar por correo electrónico la oficina de ragsdale médico y solicitar renovaciones de medicamentos en línea. En ragsdale navegador de Internet , Emilee Juliette a [email or web address] Christopher clic en el usuario por Alina Ni? Hulan Dixon clic aquí en la sesión Eliverto Agnes. Verá la página de registro [place]. Ingrese ragsdale código de [employer] july y jorje aparece a continuación. Usted no tendrá que [employer] código después de osbaldo completado el proceso de registro . Si usted no se inscribe antes de la fecha de caducidad , debe solicitar un nuevo código. · MyChart Código de acceso : [id number] Expires: 8/9/2018 10:41 AM 
 
Ingresa los últimos cuatro dígitos de ragsdale Número de Seguro Social ( xxxx ) y fecha de nacimiento ( dd / mm / aaaa ) jorje se indica y christopher clic en Enviar. patience será llevado a la siguiente página de registro . Crear un ID MyChart . Esta será ragsdale ID de inicio de sesión de MyChart y no puede ser [place] , por lo que pensar en eun que es Lujan Muckle y fácil de recordar . Crear eun contraseña MyChart . ted puede cambiar ragsdale contraseña en cualquier momento . Ingrese ragsdale Password Reset de preguntas y [place] . [place] se puede utilizar en un momento posterior si usted olvida ragsdale contraseña. Introduzca ragsdale dirección de correo electrónico . Yoni Leiva recibirá eun notificación por correo electrónico cuando la nueva información está disponible en MyChart . Valora Stefan price en Registrarse.  Elizabeth Sermon javi y descargar porciones de ragsdale expediente Rashad price en el enlace de descarga del menú Resumen para descargar eun copia portátil de ragsdale información médica . Si tiene aGlen Bautista & Co , por favor visite la sección de preguntas frecuentes del sitio web MyChart . Recuerde, MyChart NO es que se utilizará para las necesidades urgentes. Para emergencias médicas , llame al 911 . Ahora disponible en ragsdale iPhone y Android ! Por favor proporcione hazel resumen de la documentación de cuidado a ragsdale próximo proveedor. Your primary care clinician is listed as Sheri Segal. If you have any questions after today's visit, please call [phone].

## 2018-07-10 NOTE — PROGRESS NOTES
+FM. Some back pain - has had 2 episodes, every 3-4 min for about an hour; had been very active the day before. MFM today for LT choroid plexus cyst. 1hr/CBC today. RTO 4wks.

## 2018-07-10 NOTE — PATIENT INSTRUCTIONS
[facility] [phone]       Semanas 22 a 26 de ragsdale embarazo: Instrucciones de cuidado - [ Maria Del Rosario Prior 22 to 26 of Your Pregnancy: Care Instructions ]  Instrucciones de cuidado    Al comenzar el 7.º mes de ragsdale embarazo en la semana 26, los pulmones de ragsdale bebé se están volviendo más josé miguel y se están preparando para respirar. Podría notar que ragsdale bebé responde al daniel de ragsdale voz o la de ragsdale gloria. También podría notar que ragsdale bebé se voltea y [place] menos de posición, y se retuerce o sacude más. Por lo general, las sacudidas indican que ragsdale bebé tiene hipo. Tener hipo es totalmente normal y dura poco tiempo. Brian vez sea buena idea pensar en asistir a eun clase de preparación para el parto. Hazel es también un buen momento para comenzar a pensar si desea que chang el parto le administren un analgésico (medicamento para el dolor). A la mayoría de las mujeres embarazadas les hacen pruebas para la diabetes gestacional entre las semanas 24 y 29. La diabetes gestacional ocurre cuando el nivel de azúcar en la evelio es muy alto chang el [facility]. La prueba es importante, porque usted puede tener diabetes gestacional y no saberlo. Sintia esta enfermedad puede causarle problemas a ragsdale bebé. La atención de seguimiento es eun parte clave de ragsdale tratamiento y seguridad. Asegúrese de hacer y acudir a todas las citas, y llame a ragsdale médico si está teniendo problemas. También es eun buena idea saber los resultados de los exámenes y mantener eun lista de los medicamentos que geo. ¿Cómo puede cuidarse en el hogar? Alivie las molestias de las patadas de ragsdale bebé  · Cambie de posición. A veces, hazel cambio hace que ragsdale bebé también cambie de posición. · Respire hondo al mismo tiempo que levanta los brazos sobre ragsdale [place]. Después exhale a medida que baja los brazos. Aby los ejercicios de Kegel para evitar pérdidas de [facility]  · [employer] ejercicios de Kegel estando [place]ledo o sentada.   ¨ Apriete los mismos músculos que usaría para detener el chorro de [facility]. El abdomen y los muslos no deben moverse. ¨ Manténgalos apretados chang 3 segundos y, luego, relájelos otros 3 segundos. ¨ Empiece con 3 segundos. [place], añada 1 neha cada semana hasta que sea capaz de apretar chang 10 segundos. ¨ Repita el ejercicio entre 10 y 13 veces cada sesión. Aby ghassan o más sesiones cada día. Alivie o reduzca la hinchazón de los pies, los tobillos, las ana cristina y los dedos  · Si tiene los dedos hinchados, quítese los [place]. · No coma alimentos con mucha sal, jorje santos fritas. · Coloque lisette pies sobre un banco o un sofá todo el tiempo que le sea posible. Duerma con almohadas debajo de los pies. · No se quede de pie chang mucho tiempo ni use calzado ajustado. · Use medias elásticas de soporte. ¿Dónde puede encontrar más información en inglés? Dayanna Huango a http://all-yolis.info/. Escriba G264 en la búsqueda para aprender más acerca de \"Semanas 22 a 26 de ragsdale embarazo: Instrucciones de cuidado - [ Shelley Horn 22 to 26 of Your Pregnancy: Care Instructions ]. \"  Revisado: 16 marzo, 2017  Versión del contenido: 11.4  © 5792-7630 Healthwise, Incorporated. Las instrucciones de cuidado fueron adaptadas bajo licencia por Good Help Connections (which disclaims liability or warranty for this information). Si usted tiene [place] afección médica o sobre estas instrucciones, siempre pregunte a ragsdale profesional de [place]. Healthwise, Incorporated niega toda garantía o responsabilidad por ragsdale uso de esta información.

## 2018-07-11 LAB
ERYTHROCYTE [DISTWIDTH] IN BLOOD BY AUTOMATED COUNT: 13 % (ref 12.3–15.4)
GLUCOSE 1H P 50 G GLC PO SERPL-MCNC: 110 MG/DL (ref 65–139)
HCT VFR BLD AUTO: 32.9 % (ref 34–46.6)
HGB BLD-MCNC: 10.9 G/DL (ref 11.1–15.9)
MCH RBC QN AUTO: 30 PG (ref 26.6–33)
MCHC RBC AUTO-ENTMCNC: 33.1 G/DL (ref 31.5–35.7)
MCV RBC AUTO: 91 FL (ref 79–97)
PLATELET # BLD AUTO: 275 X10E3/UL (ref 150–379)
RBC # BLD AUTO: 3.63 X10E6/UL (ref 3.77–5.28)
WBC # BLD AUTO: 8.7 X10E3/UL (ref 3.4–10.8)

## 2018-08-07 ENCOUNTER — ROUTINE PRENATAL (OUTPATIENT)
Dept: OBGYN CLINIC | Age: 28
End: 2018-08-07

## 2018-08-07 VITALS
HEIGHT: 61 IN | HEART RATE: 121 BPM | DIASTOLIC BLOOD PRESSURE: 60 MMHG | SYSTOLIC BLOOD PRESSURE: 92 MMHG | BODY MASS INDEX: 28.32 KG/M2 | WEIGHT: 150 LBS

## 2018-08-07 DIAGNOSIS — Z23 ENCOUNTER FOR IMMUNIZATION: ICD-10-CM

## 2018-08-07 DIAGNOSIS — Z34.83 ENCOUNTER FOR SUPERVISION OF OTHER NORMAL PREGNANCY IN THIRD TRIMESTER: Primary | ICD-10-CM

## 2018-08-07 NOTE — PROGRESS NOTES
+FM. [facility] [place] have calmed down. TDAP today.  consent. RTO 3 weeks. - Unsure LMP.  US for datin+3, ALISSA=10/30/18  - NOB labs today WNL 18  - QS low risk  - US (18) 20+0 @ 19+6. Ant placenta. Left choroid plexus cyst -> MFM  - MFM US (7/10/18) 24+4 @ 24+0. 740gm (60%). [facility] resolved.

## 2018-08-07 NOTE — PATIENT INSTRUCTIONS
Weeks 26 to 30 of Your Pregnancy: Care Instructions  Your Care Instructions    You are now in your last trimester of pregnancy. Your baby is growing rapidly. And you'll probably feel your baby moving around more often. Your doctor may ask you to count your baby's kicks. Your back may ache as your body gets used to your baby's size and length. If you haven't already had the Tdap shot during this pregnancy, talk to your doctor about getting it. It will help protect your  against pertussis infection. During this time, it's important to take care of yourself and pay attention to what your body needs. If you feel sexual, explore ways to be close with your partner that match your comfort and desire. Use the tips provided in this care sheet to find ways to be sexual in your own way. Follow-up care is a key part of your treatment and safety. Be sure to make and go to all appointments, and call your doctor if you are having problems. It's also a good idea to know your test results and keep a list of the medicines you take. How can you care for yourself at home? Take it easy at work  · Take frequent breaks. If possible, stop working when you are tired, and rest during your lunch hour. · Take bathroom breaks every 2 hours. · Change positions often. If you sit for long periods, stand up and walk around. · When you stand for a long time, keep one foot on a low stool with your knee bent. After standing a lot, sit with your feet up. · Avoid fumes, chemicals, and tobacco smoke. Be sexual in your own way  · Having sex during pregnancy is okay, unless your doctor tells you not to. · You may be very interested in sex, or you may have no interest at all. · Your growing belly can make it hard to find a good position during intercourse. Clara and explore. · You may get cramps in your uterus when your partner touches your breasts.   · A back rub may relieve the backache or cramps that sometimes follow orgasm. Learn about  labor  · Watch for signs of  labor. You may be going into labor if:  ¨ You have menstrual-like cramps, with or without nausea. ¨ You have about 6 or more contractions in 1 hour, even after you have had a glass of water and are resting. ¨ You have a low, dull backache that does not go away when you change your position. ¨ You have pain or pressure in your pelvis that comes and goes in a pattern. ¨ You have intestinal cramping or flu-like symptoms, with or without diarrhea. ¨ You notice an increase or change in your vaginal discharge. Discharge may be heavy, mucus-like, watery, or streaked with blood. ¨ Your water breaks. · If you think you have  labor:  ¨ Drink 2 or 3 glasses of water or juice. Not drinking enough fluids can cause contractions. ¨ Stop what you are doing, and empty your bladder. Then lie down on your left side for at least 1 hour. ¨ While lying on your side, find your breast bone. Put your fingers in the soft spot just below it. Move your fingers down toward your belly button to find the top of your uterus. Check to see if it is tight. ¨ Contractions can be weak or strong. Record your contractions for an hour. Time a contraction from the start of one contraction to the start of the next one. ¨ Single or several strong contractions without a pattern are called [place] contractions. They are practice contractions but not the start of labor. They often stop if you change what you are doing. ¨ Call your doctor if you have regular contractions. Where can you learn more? Go to http://all-yolis.info/. Enter E375 in the search box to learn more about \"Weeks 26 to 30 of Your Pregnancy: Care Instructions. \"  Current as of: 2017  Content Version: 11.7  © 3030-0137 [employer]. Care instructions adapted under license by [employer] (which disclaims liability or warranty for this information). If you have questions about a medical condition or this instruction, always ask your healthcare professional. [place] [address] any warranty or liability for your use of this information.

## 2018-08-07 NOTE — PROGRESS NOTES
33year old  28wod pregnant patient given the 0.5ml t-dap injection. Patient signed consent. Patient tolerated injection in left deltoid with out complications.

## 2018-08-29 ENCOUNTER — ROUTINE PRENATAL (OUTPATIENT)
Dept: OBGYN CLINIC | Age: 28
End: 2018-08-29

## 2018-08-29 VITALS
WEIGHT: 158 LBS | HEART RATE: 100 BPM | DIASTOLIC BLOOD PRESSURE: 67 MMHG | BODY MASS INDEX: 29.83 KG/M2 | HEIGHT: 61 IN | SYSTOLIC BLOOD PRESSURE: 94 MMHG

## 2018-08-29 DIAGNOSIS — Z34.83 ENCOUNTER FOR SUPERVISION OF OTHER NORMAL PREGNANCY IN THIRD TRIMESTER: Primary | ICD-10-CM

## 2018-08-29 NOTE — MR AVS SNAPSHOT
[address] [place] [address] [address] [facility] [address] [facility] 
[phone] Patient: Jason Franklin MRN: [id number] LYT:3/04/0715 Visit Information Julian Bull y Shashi Personal Médico Departamento Teléfono del Dep. Número de visita 8/29/2018 10:20 AM [address] [phone]  
  
 9/12/2018 11:10 AM  
OB VISIT with Leonard Rausch MD  
[employer] ([facility]) Appt Note: 33w1d (MG)  
 [address] [facility] [address] [place] [address] [phone]  
[facility] 31 [address] Upcoming Health Maintenance Date Due Influenza Age 5 to Adult 8/1/2018 PAP AKA CERVICAL CYTOLOGY 5/11/2021 Alergias  Review Complete El: 8/29/2018 Por: Racquel Betts A partir del:  8/29/2018 No Known Allergies Vacunas actuales Revisadas el:  12/9/2015 Tyra Berumen Influenza Vaccine Vadim Valorie) 9/29/2015 Tdap 8/7/2018, 10/27/2015  2:02 PM  
  
 No revisadas esta visita Partes vitales PS Pulso [place] ( percentil de crecimiento) Peso (percentil de crecimiento) LMP (última hailey) Está amamantando? 94/67 100 5' 1\" (1.549 m) 158 lb (71.7 kg) 01/15/2018 (Approximate) No  
 BMI ([facility]) Estado obstétrico Estatus de tabaquísmo 29.85 kg/m2 Pregnant Never Smoker BMI and BSA Data Body Mass Index Body Surface Area  
 29.85 kg/m 2 1.76 m 2 [facility] Name Phone [facility] [address] [place] [address], [address] [phone] [place] lista de medicamentos actualizada Lista actualizada 8/29/18 11:04 AM.  Mauro Mccall use [place] lista de medicamentos más reciente. folic acid 666 mcg tablet Take 800 mcg by mouth daily. PRENATA PO Take  by mouth. raNITIdine 150 mg tablet Kartik alcocer jorje:  ZANTAC Take 1 Tab by mouth two (2) times a day. Instrucciones para el Paciente MyChart Help Desk: [phone] Semanas 30 a 32 de ragsdale embarazo: Instrucciones de cuidado - [ Laci Zapien 30 to 28 of Your Pregnancy: Care Instructions ] Instrucciones de cuidado Edgarnel Moritz a los últimos meses de embarazo. A estas [place], r[place] bebé en verdad comienza a tener la apariencia de un bebé, con smooth y piel rellenita. A medida que entra en las últimas semanas de [facility], usted comenzará a caer en la realidad de que va a tener un bebé. Hazel es el momento de elegir un nombre, ordenar r[place]dale casa, organizar un cuarto de niños seguro y buscar atención infantil de calidad, de ser necesaria. Hacer esto con anterioridad le permitirá concentrarse en cuidar y disfrutar de r[place] bebé. También podría hacer eun visita a la denise de partos del hospital para Yevgeniy Kleine mejor idea sobre qué esperar mientras está en el hospital. 
Chang estos últimos meses, es muy importante que se cuide y preste atención a lo que [place] cuerpo necesita. Si r[place] médico le dice que está jessica que trabaje, no se exija demasiado. Siga las recomendaciones proporcionadas en esta hoja de cuidados para aliviar la acidez estomacal y [address] várices. Si todavía no le kohtari aplicado la vacuna Tdap (tétanos, difteria y tos [place]) chang hazel [facility], hable con r[place]dale médico acerca de aplicársela. Miriam Mullet a proteger a r[place] recién nacido contra la infección por tos ferina. La atención de seguimiento es eun parte clave de [place] tratamiento y seguridad. Asegúrese de hacer y acudir a todas las citas, y llame a r[place]dale médico si está teniendo problemas. También es eun buena idea saber los resultados de lisette exámenes y mantener eun lista de los medicamentos que geo. Cómo puede cuidarse en el hogar? Preste atención a los movimientos de r[place] bebé · Debería sentir que r[place] bebé se mueve varias veces al día.  
· [place] bebé ahora cambia menos de posición, y patea y da golpes con [place] frecuencia. · [place] bebé duerme de 20 a 45 minutos cada vez y [address] en determinados momentos del día. · Si r[place]dale médico quiere que cuente las patadas de r[place]dale bebé: ¨ Vacíe r[place] vejiga y acuéstese de lado o recuéstese en eun silla cómoda. ¨ Anote la hora inicial. 
¨ Preste atención solo a los movimientos de r[place] bebé. Cuente todos los movimientos, excepto los del hipo. ¨ Después de que haya contado 10 movimientos, anote la hora. ¨ Anote cuántos minutos le llevaron a r[place] bebé los 10 movimientos. ¨ Si después de eun hora no ha registrado 10 movimientos, coma o aleksandar algo, y luego cuente por otra hora. Si no registra 10 movimientos en cualquiera de las horas, llame a r[place] médico. 
Alivie la acidez estomacal 
· Coma comidas pequeñas y frecuentes. · No coma chocolate, menta ni comidas muy picantes. Evite las bebidas con cafeína, jorje el café, el té y las sodas. · Evite doblarse hacia adelante o acostarse después de comer. · Aby eun caminata corta después de comer. · Si tiene problemas de [place] estomacal chang la noche, no coma por 2 horas antes de acostarse. · [place] antiácidos, jorje Mylanta, Maalox, Rolaids o Tums. No tome antiácidos que contengan bicarbonato de sodio. [address] [facility] · Las várices son vasos sanguíneos que se dilatan debido a la mayor cantidad de evelio chang el embarazo. Puede tener dolor o palpitaciones en las piernas. La mayoría de las várices desaparecerán después del Shell. · Evite estar toledo chang mucho tiempo. Siéntese con las piernas cruzadas a la altura de los tobillos, no de las rodillas. · Siéntese con los pies elevados. · Evite usar ropa o medias ajustadas. Use medias de compresión. · Aby ejercicio con regularidad. Trate de caminar por lo menos 30 minutos al día. Dónde puede encontrar más información en inglés? Maxx Sharma a http://all-yolis.info/.  
Luisa Dana [id number] en la búsqueda para aprender más acerca de \"Semanas 30 a 28 de ragsdale embarazo: Instrucciones de cuidado - [ Lynne Riding 30 to 28 of Your Pregnancy: Care Instructions ]. \" 
Revisado: 21 noviembre, 2017 Versión del contenido: 11.7 © 3875-3198 Healthwise, Incorporated. Las instrucciones de cuidado fueron adaptadas bajo licencia por Good Help Connections (which disclaims liability or warranty for this information). Si usted tiene [place] afección médica o sobre estas instrucciones, siempre pregunte a [place] profesional de [place]. Healthwise, Incorporated niega toda garantía o responsabilidad por r[place] uso de esta información. Introducing [facility] [place]! [facility] introduce portal paciente [place] . Ahora se puede acceder a partes de r[place] expediente médico, enviar por correo electrónico la oficina de r[place] médico y solicitar renovaciones de medicamentos en línea. En r[place]dale navegador de Internet , Sarah aguirre [email or web address] Christopher clic en el usuario por [facility]? Lala Multani clic aquí en la sesión Guero Roche. Verá la página de registro [place]. Ingrese r[place]dale código de [facility] july y jorje aparece a continuación. Usted no tendrá que [employer] código después de osbaldo completado el proceso de registro . Si usted no se inscribe antes de la fecha de caducidad , debe solicitar un nuevo código. · MyChart Código de acceso : [id number] Expires: 11/27/2018 11:04 AM 
 
Ingresa los últimos cuatro dígitos de r[place] Número de Seguro Social ( xxxx ) y fecha de nacimiento ( dd / mm / aaaa ) jorje se indica y christopher clic en Enviar. ted será llevado a la siguiente página de registro . Crear un ID [id number] . Esta será [place] ID de inicio de sesión de MyChart y no puede ser [place] , por lo que pensar en eun que es Amee Drones y fácil de recordar . Crear eun contraseña MyChart . Usted puede cambiar [place] contraseña en cualquier momento . Ingrese r[place]dale Password Reset de preguntas y [place] . Tama se puede utilizar en un momento posterior si usted olvida r[place] contraseña. Introduzca r[place] dirección de correo electrónico . Darinel Peed recibirá eun notificación por correo electrónico cuando la nueva información está disponible en MyChart . Matilda Hunger clic en Registrarse. Beth Valdivia javi y descargar porciones de r[place] expediente médico. 
Aby clic en el enlace de descarga del menú Resumen para descargar eun copia portátil de r[place] información médica . Si tiene [employer] , por favor visite la sección de preguntas frecuentes del sitio web [employer]CJive Softwaret . Recuerde, TareasPlust NO es que se utilizará para las necesidades urgentes. Para emergencias médicas , llame al 911 . Ahora disponible en r[place] iPhone y Android ! Por favor proporcione hazel resumen de la documentación de cuidado a r[place] próximo proveedor. Your primary care clinician is listed as Mario Rushing. If you have any questions after today's visit, please call [phone].

## 2018-08-29 NOTE — PROGRESS NOTES
+FM.  Some back pain heaven with walking; mat belt helps. Handout given for back exercises. RTO 2wks.

## 2018-08-29 NOTE — PATIENT INSTRUCTIONS
[phone]       Semanas 30 a 32 de ragsdale embarazo: Instrucciones de cuidado - [ Suzanne Milo 30 to 28 of Your Pregnancy: Care Instructions ]  Instrucciones de cuidado    Simeon Cloud a los últimos meses de [facility]. A estas [place], [place] bebé en verdad comienza a tener la apariencia de un bebé, con smooth y piel rellenita. A medida que entra en las últimas semanas de [facility], usted comenzará a caer en la realidad de que va a tener un bebé. Rosaline es el momento de elegir un nombre, ordenar r[place] casa, organizar un cuarto de niños seguro y buscar atención infantil de calidad, de ser necesaria. Hacer esto con anterioridad le permitirá concentrarse en cuidar y disfrutar de r[place] bebé. También podría hacer eun visita a la denise de partos del hospital para Anell Cos mejor idea sobre qué esperar mientras está en el hospital.  Chang estos últimos meses, es muy importante que se cuide y preste atención a lo que [place] cuerpo necesita. Si r[place] médico le dice que está jessica que trabaje, no se exija demasiado. Siga las recomendaciones proporcionadas en esta hoja de cuidados para aliviar la acidez estomacal y [address] várices. Si todavía no le kothari aplicado la vacuna Tdap (tétanos, difteria y tos [place]) chang rosaline [facility], hable con r[place] médico acerca de aplicársela. [place] Michael a proteger a r[place] recién nacido contra la infección por tos ferina. La atención de seguimiento es eun parte clave de [place] tratamiento y seguridad. Asegúrese de hacer y acudir a todas las citas, y llame a r[place] médico si está teniendo problemas. También es eun buena idea saber los resultados de lisette exámenes y mantener eun lista de los medicamentos que geo. ¿Cómo puede cuidarse en el hogar? Preste atención a los movimientos de r[place] bebé  · Debería sentir que [place] bebé se mueve varias veces al día. · [place] bebé ahora cambia menos de posición, y patea y da golpes con más frecuencia.   · [place] bebé duerme de 20 a 45 minutos cada vez y [address] en determinados momentos del día. · Si [place] médico quiere que cuente las patadas de rags[place] bebé:  ¨ Vacíe [place] vejiga y acuéstese de lado o recuéstese en eun silla cómoda. ¨ Anote la hora inicial.  ¨ Preste atención solo a los movimientos de [place] bebé. Cuente todos los movimientos, excepto los del hipo. ¨ Después de que haya contado 10 movimientos, anote la hora. ¨ Anote cuántos minutos le llevaron a [place] bebé los 10 movimientos. ¨ Si después de eun hora no ha registrado 10 movimientos, coma o aleksandar algo, y luego cuente por otra hora. Si no registra 10 movimientos en cualquiera de las horas, llame a r[place]dale médico.  Alivie la acidez estomacal  · Coma comidas pequeñas y frecuentes. · No coma chocolate, menta ni comidas muy picantes. Evite las bebidas con cafeína, jorje el café, el té y las sodas. · Evite doblarse hacia adelante o acostarse después de comer. · Aby eun caminata corta después de comer. · Si tiene problemas de [place] estomacal chang la noche, no coma por 2 horas antes de acostarse. · Miguel Barrera antiácidos, jorje Mylanta, Maalox, Rolaids o Tums. No tome antiácidos que contengan bicarbonato de sodio. Cuide las várices  · Las várices son vasos sanguíneos que se dilatan debido a la mayor cantidad de evelio chang el embarazo. Puede tener dolor o palpitaciones en las piernas. La mayoría de las várices desaparecerán después del Shell. · Evite estar toledo chang mucho tiempo. Siéntese con las piernas cruzadas a la altura de los tobillos, no de las rodillas. · Siéntese con los pies elevados. · Evite usar ropa o medias ajustadas. Use medias de compresión. · Aby ejercicio con regularidad. Trate de caminar por lo menos 30 minutos al día. ¿Dónde puede encontrar más información en inglés? Jovanna Cast a http://all-yolis.info/. [place] K969 en la búsqueda para aprender más acerca de \"Semanas 30 a 28 de ragsdale embarazo: Instrucciones de cuidado - [ Laci Zapien 30 to 28 of Your Pregnancy: Care Instructions ]. \"  Revisado: 21 Estel Kocher 2017  Versión del contenido: 11.7  © 7200-3315 [employer]. Las instrucciones de cuidado fueron adaptadas bajo licencia por Good Help Connections (which disclaims liability or warranty for this information). Si usted tiene [place] afección médica o sobre estas instrucciones, siempre pregunte a [place] profesional de [place]. [employer] niega toda garantía o responsabilidad por r[place] uso de esta información.

## 2018-09-12 ENCOUNTER — ROUTINE PRENATAL (OUTPATIENT)
Dept: OBGYN CLINIC | Age: 28
End: 2018-09-12

## 2018-09-12 VITALS
WEIGHT: 161.8 LBS | HEART RATE: 104 BPM | DIASTOLIC BLOOD PRESSURE: 71 MMHG | BODY MASS INDEX: 30.55 KG/M2 | HEIGHT: 61 IN | SYSTOLIC BLOOD PRESSURE: 108 MMHG

## 2018-09-12 DIAGNOSIS — Z23 ENCOUNTER FOR IMMUNIZATION: Primary | ICD-10-CM

## 2018-09-12 NOTE — PROGRESS NOTES
+FM. Flu vacc today. Some diarrhea, resolving. +back/hip pain - mat belt, exercises helpful. RTO 2wks with GBS.

## 2018-09-12 NOTE — PROGRESS NOTES
Verbal order received for immunization/injection per MD.  Immunization/s/Injection/s administered 9/12/2018 by Debbie Mota LPN with patient/guardian's consent.  Patient monitored in office for 20 mins, Patient tolerated procedure well.  No reactions noted.

## 2018-09-27 ENCOUNTER — ROUTINE PRENATAL (OUTPATIENT)
Dept: OBGYN CLINIC | Age: 28
End: 2018-09-27

## 2018-09-27 VITALS
DIASTOLIC BLOOD PRESSURE: 67 MMHG | HEIGHT: 61 IN | WEIGHT: 164 LBS | SYSTOLIC BLOOD PRESSURE: 109 MMHG | HEART RATE: 112 BPM | BODY MASS INDEX: 30.96 KG/M2

## 2018-09-27 DIAGNOSIS — Z34.83 ENCOUNTER FOR SUPERVISION OF OTHER NORMAL PREGNANCY IN THIRD TRIMESTER: Primary | ICD-10-CM

## 2018-09-27 DIAGNOSIS — Z3A.35 35 WEEKS GESTATION OF PREGNANCY: ICD-10-CM

## 2018-09-27 LAB — GRBS, EXTERNAL: POSITIVE

## 2018-09-27 NOTE — PROGRESS NOTES
+FM. Some [facility]. MAt belt helping back pain. Stopped Fe d/t GI side effects - adv to try diff form. GBS done. RTO 1wk.

## 2018-09-29 LAB — GP B STREP DNA SPEC QL NAA+PROBE: POSITIVE

## 2018-10-02 ENCOUNTER — ROUTINE PRENATAL (OUTPATIENT)
Dept: OBGYN CLINIC | Age: 28
End: 2018-10-02

## 2018-10-02 VITALS
WEIGHT: 166 LBS | HEART RATE: 77 BPM | DIASTOLIC BLOOD PRESSURE: 59 MMHG | HEIGHT: 61 IN | SYSTOLIC BLOOD PRESSURE: 91 MMHG | BODY MASS INDEX: 31.34 KG/M2

## 2018-10-02 DIAGNOSIS — Z34.83 ENCOUNTER FOR SUPERVISION OF OTHER NORMAL PREGNANCY IN THIRD TRIMESTER: Primary | ICD-10-CM

## 2018-10-02 NOTE — PATIENT INSTRUCTIONS
MyChart Help [facility]: [phone]       Semanas 34 a 36 de ragsdale embarazo: Instrucciones de cuidado - [ Crystal Reasons 34 to 39 of Your Pregnancy: Care Instructions ]  Instrucciones de cuidado    A estas [place], r[place]dale bebé y ragsdale abdomen habrán crecido considerablemente. Joycelyn es [place] de sri a lula. En un embarazo a término se puede sri a [employer] 40 y 43. Los pulmones de r[place] bebé están joycelyn listos para respirar aire. Los huesos de la andres de r[place] bebé ahora son bastante firmes jorje para protegerla anyi se mantienen lo suficientemente blandos jorje para moverse al atravesar el canal de [place]. Es posible que sienta entusiasmo, chandan, ansiedad o miedo. Quizá se pregunte cómo se dará cuenta de si está en trabajo de parto o qué esperar en kalee momento. Trate de ser flexible con lisette expectativas respecto del nacimiento. Dado que cada nacimiento es diferente, no hay manera de saber exactamente cómo será r[place] parto. Esta hoja de cuidados la ayudará a saber qué esperar y cómo prepararse. Le podría facilitar el parto. Si todavía no le kothari aplicado la vacuna Tdap (tétanos, difteria y tos [place]) chang hazel Kaylee Gutting, hable con r[place]dale médico acerca de aplicársela. Jhoan Newell a proteger a r[place]dale recién nacido contra la infección por tos ferina. En la semana 36, a la mayoría de las mujeres se les hace eun prueba de estreptococos del marcela B (GBS, por lisette siglas en inglés). Los estreptococos del marcela B son bacterias comunes que pueden vivir en la vagina y el recto. Pueden hacer que [place] bebé se enferme después del parto. Si el resultado es positivo, usted recibirá antibióticos chang el trabajo de [place]. Los medicamentos evitarán que [place] bebé contraiga las bacterias. La atención de seguimiento es eun parte clave de r[place] tratamiento y seguridad. Asegúrese de hacer y acudir a todas las citas, y llame a r[place] médico si está teniendo problemas.  También es eun buena idea saber los resultados de lisette exámenes y mantener eun lista de los medicamentos que geo. ¿Cómo puede cuidarse en el hogar? Aprenda sobre las alternativas para aliviar el dolor  · El dolor se manifiesta de modo diferente en cada vicenta. Hable con r[place] médico acerca de lisette sentimientos sobre el dolor. · Puede elegir entre varias formas de aliviar el dolor. Estas incluyen medicamentos o técnicas de respiración, así jorje medidas para estar cómoda. Usted puede utilizar más de [employer] opción. · Si elige un analgésico (medicamento para el dolor) chang el trabajo de [place], hable con [place] médico acerca de lisette opciones. Algunos medicamentos reducen la ansiedad y [place] [facility] a aliviar parte del dolor. Otros adormecen la parte inferior del cuerpo para que no sienta dolor. · Asegúrese de decirle a r[place] médico acerca de r[place] elección de analgésico antes de empezar el trabajo de parto o muy temprano en el [place]. Es posible que pueda cambiar de parecer a medida que avanza el [place]. · Majo vez se duerme a eun vicenta con medicamentos administrados a través de eun máscara o por vía intravenosa (IV). Trabajo de parto y [place]  · La primera etapa del [place] de parto se divide en ghassan fases: Lou Pew y de transición. ¨ La mayoría de las mujeres experimentan la fase latente del [place] de parto en lisette hogares. Usted puede [employer] o descansar, comer refrigerios livianos, beber líquidos zamzam y comenzar a contar las contracciones. ¨ Cuando advierta que se le vuelve difícil hablar chang eun contracción, es posible que esté por pasar a la fase activa. Chang la fase [place], debería ir al hospital si no está allí aún. ¨ Usted está en la fase activa cuando tiene contracciones cada 3 o 4 minutos y munoz alrededor de 60 segundos. El deny uterino comienza a abrirse con Emery Gomez. ¨ Si se le rompe la dacia, las contracciones serán más intensas y más frecuentes. ¨ Chang la fase de transición, el deny uterino se estira y las contracciones se producen con Emery Gomez.   Sam Rankin tenga deseos de pujar, sin embargo es posible que el deny uterino aún no esté preparado. El médico le dirá cuándo pujar. · La segunda etapa comienza cuando el deny uterino se abre por completo y usted está lista para pujar. ¨ Las contracciones son muy intensas a fin de empujar al bebé por el canal de parto. ¨ Sentirá la necesidad de pujar. Podría sentir jorje si tuviera ganas de evacuar el intestino. ¨ Quizás la entrenen a [place] 41 contracciones. Estas contracciones serán muy intensas anyi no ocurrirán con tanta frecuencia. Puede descansar un poco entre contracciones. ¨ Es posible que esté sensible e irritable. Es posible que no se dé cuenta de lo que pasa a r[place]dale alrededor. ¨ Un último esfuerzo y habrá nacido ragsdale bebé. · La tercera etapa ocurre cuando con unas cuantas contracciones más se expulsa la placenta. [place] puede durar 30 minutos o menos. · La cuarta etapa es la de recuperación. Es posible que se sienta abrumada con las emociones y exhausta anyi alerta. Rosaline es un buen momento para comenzar el amamantamiento. ¿Dónde puede encontrar más información en inglés? Lindsey Bateman a http://all-yolis.info/. Baltazar Davey I586 en la búsqueda para aprender más acerca de \"Semanas 34 a 39 de ragsdale embarazo: Instrucciones de cuidado - [ Wendy Keene 34 to 39 of Your Pregnancy: Care Instructions ]. \"  Revisado: 21 noviembre, 2017  Versión del contenido: 11.7  © 0979-0975 [employer]VEN[employer], Incorporated. Las instrucciones de cuidado fueron adaptadas bajo licencia por Good Help Connections (which disclaims liability or warranty for this information). Si usted tiene [place] afección médica o sobre estas instrucciones, siempre pregunte a [place] profesional de [place]. [facility], Incorporated niega toda garantía o responsabilidad por [place] uso de esta información.

## 2018-10-02 NOTE — MR AVS SNAPSHOT
[address] [place] [address] [facility] [address] 
[phone] Patient: Christy Chris MRN: [id number] NUU:[id number] Visit Information Nisha Dela Cruz y Shashi Personal Médico Departamento Teléfono del Dep. Número de visita  
 10/2/2018  1:[address], MD [place] [phone]  
  
 10/9/2018 10:40 AM  
OB VISIT with [address], MD  
[place] ([address]) Appt Note: 37w0d (MG)  
 [address] [facility] [address]  
[phone]  
  
   
 [address] [place] [address] [address] [facility]  
  
    
 10/16/2018 10:30 AM  
OB VISIT with [address], [place]  
[place] ([address]) Appt Note: 38w0d (MG)  
 [address] [facility] [address] [facility]  
[phone]  
  
    
 10/23/2018 10:40 AM  
OB VISIT with [address], [place]  
[place] ([address]) Appt Note: 39w0d (M)G  
 [address] [address] [facility] [address] [facility] [address] [facility]  
[phone]  
  
    
 10/30/2018 10:40 AM  
OB VISIT with [address], [place]  
[place] ([address]) Appt Note: 40w0d (MG)  
 [address] [facility] [address] [facility]  
[phone] Upcoming Health Maintenance Date Due  
 PAP AKA CERVICAL CYTOLOGY 5/11/2021 Alergias  Review Complete El: 10/2/2018 Por: Osmar Shinmyles A partir del:  10/2/2018 No Known Allergies Vacunas actuales Revisadas el:  9/12/2018 [place] Found Influenza Vaccine Freada League) 9/29/2015 Influenza Vaccine (Quad) PF 9/12/2018 Tdap 8/7/2018, 10/27/2015  2:02 PM  
  
 No revisadas esta visita Partes vitales PS [place] ( percentil de crecimiento) Peso (percentil de crecimiento) LMP (última hailey) Está amamantando?   
 91/59 77 5' 1\" (1.549 m) 166 lb (75.3 kg) 01/15/2018 (Approximate) No  
 BMI [facility]) Estado obstétrico Estatus de tabaquísmo 31.37 kg/m2 Pregnant Never Smoker BMI and BSA Data Body Mass Index Body Surface Area  
 31.37 kg/m 2 1.8 m 2 [facility] Name Phone [facility] [place] [address], [address] [phone] Bocanegra lista de medicamentos actualizada Lista actualizada 10/2/18  2:13 PM.  Alvaro Lula use bocanegra lista de medicamentos más reciente. folic acid 610 mcg tablet Take 800 mcg by mouth daily. PRENATA PO Take  by mouth. raNITIdine 150 mg tablet También conocido jorje:  ZANTAC Take 1 Tab by mouth two (2) times a day. Instrucciones para el Paciente MyChart Help Desk: [phone] Semanas 34 a 36 de bocanegra embarazo: Instrucciones de cuidado - [ Miquel Fritz 34 to 39 of Your Pregnancy: Care Instructions ] Instrucciones de cuidado A estas [place], bocanegra bebé y bocanegra abdomen habrán crecido considerablemente. Randolph es Johnny de sri a lula. En un embarazo a término se puede sri a [employer] 40 y [address]. Los pulmones de bocanegra bebé están randolph listos para respirar aire. Los huesos de la andres de bocanegra bebé ahora son bastante firmes jorje para protegerla anyi se mantienen lo suficientemente blandos jorje para moverse al atravesar el canal de [place]. Es posible que sienta entusiasmo, chandan, ansiedad o miedo. Quizá se pregunte cómo se dará cuenta de si está en trabajo de parto o qué esperar en kalee momento. Trate de ser flexible con lisette expectativas respecto del nacimiento. Dado que cada nacimiento es diferente, no hay manera de saber exactamente cómo será bocanegra parto. Esta hoja de cuidados la ayudará a saber qué esperar y cómo prepararse. Le podría facilitar el parto. Si todavía no le kothari aplicado la vacuna Tdap (tétanos, difteria y tos [place]) chang hazel [facility], hable con bocanegra médico acerca de aplicársela. Patricia Hides a proteger a r[place]dale recién nacido contra la infección por tos ferina. En la semana 36, a la mayoría de las mujeres se les hace eun prueba de estreptococos del marcela B (GBS, por lisette siglas en inglés). Los estreptococos del marcela B son bacterias comunes que pueden vivir en la vagina y el recto. Pueden hacer que ragsdale bebé se enferme después del parto. Si el resultado es positivo, shahid recibirá antibióticos chang el trabajo de [place]. Los medicamentos evitarán que r[place] bebé contraiga las bacterias. La atención de seguimiento es eun parte clave de ragsdale tratamiento y seguridad. Asegúrese de hacer y acudir a todas las citas, y llame a r[place]dale médico si está teniendo problemas. También es eun buena idea saber los resultados de lisette exámenes y mantener eun lista de los medicamentos que geo. Cómo puede cuidarse en el hogar? [place] alternativas para aliviar el dolor · El dolor se manifiesta de modo diferente en cada vicenta. Hable con r[place]dale médico acerca de lisette sentimientos sobre el dolor. · Puede elegir entre varias formas de aliviar el dolor. Estas incluyen medicamentos o técnicas de respiración, así jorje medidas para estar cómoda. Usted puede utilizar más de [place] opción. · Si elige un analgésico (medicamento para el dolor) chang el trabajo de [place], hable con ragsdale médico acerca de lisette opciones. Algunos medicamentos reducen la ansiedad y [facility] a aliviar parte del dolor. Otros adormecen la parte inferior del cuerpo para que no sienta dolor. · Asegúrese de decirle a ragsdale médico acerca de ragsdale elección de analgésico antes de empezar el trabajo de parto o muy temprano en el [place]. Es posible que pueda cambiar de parecer a medida que avanza el [place] de [place]. · Majo vez se duerme a eun vicenta con medicamentos administrados a través de eun máscara o por vía intravenosa (IV). Ova Blahenrietta y Shell · La primera etapa del [place] de parto se divide en ghassan fases: latente, activa y de transición. ¨ La mayoría de las mujeres experimentan la fase latente del [place] de parto en lisette hogares. Usted puede [employer] o descansar, comer refrigerios livianos, beber líquidos zamzam y comenzar a contar las contracciones. ¨ Cuando advierta que se le vuelve difícil hablar chang eun contracción, es posible que esté por pasar a la fase activa. Chang la fase Felix Yousif, debería ir al hospital si no está allí aún. ¨ Usted está en la fase activa cuando tiene contracciones cada 3 o 4 minutos y munoz alrededor de 60 segundos. El deny uterino comienza a abrirse con Daya Richards. ¨ Si se le rompe la dacia, las contracciones serán más intensas y más frecuentes. ¨ Chang la fase de transición, el deny uterino se estira y las contracciones se producen con Daya Richards. ¨ Quizá tenga deseos de pujar, sin embargo es posible que el deny uterino aún no esté preparado. El médico le dirá cuándo pujar. · La segunda etapa comienza cuando el deny uterino se abre por completo y usted está lista para pujar. ¨ Las contracciones son muy intensas a fin de empujar al bebé por el canal de parto. ¨ Sentirá la necesidad de pujar. Podría sentir jorje si tuviera ganas de evacuar el intestino. ¨ Quizás la entrenen a [place] [address] contracciones. Estas contracciones serán muy intensas anyi no ocurrirán con tanta frecuencia. Puede descansar un poco entre contracciones. ¨ Es posible que esté sensible e irritable. Es posible que no se dé cuenta de lo que pasa a ragsdale alrededor. ¨ Un último esfuerzo y habrá nacido ragsdale bebé. · La tercera etapa ocurre cuando con unas cuantas contracciones más se expulsa la placenta. [place] puede durar 30 minutos o menos. · La cuarta etapa es la de recuperación. Es posible que se sienta abrumada con las emociones y exhausta anyi alerta. Rosaline es un buen momento para comenzar el amamantamiento. Dónde puede encontrar más información en inglés? Tracie Tariq a http://all-yolis.info/. Tensed Lazara G245 en la búsqueda para aprender más acerca de \"Semanas 34 a 39 de ragsdale embarazo: Instrucciones de cuidado - [ Miriam Michelle 34 to 39 of Your Pregnancy: Care Instructions ]. \" 
Revisado: 21 noviembre, 2017 Versión del contenido: 11.7 © 8111-3169 Healthwise, Incorporated. Las instrucciones de cuidado fueron adaptadas bajo licencia por Good Help Connections (which disclaims liability or warranty for this information). Si usted tiene [place] afección médica o sobre estas instrucciones, siempre pregunte a ragsdale profesional de [place]. Healthwise, Incorporated niega toda garantía o responsabilidad por ragsdale uso de esta información. Introducing [facility]! [facility] introduce portal paciente MyChart . Ahora se puede acceder a partes de ragsdale expediente médico, enviar por correo electrónico la oficina de ragsdale médico y solicitar renovaciones de medicamentos en línea. En ragsdale navegador de Internet , Cyrena Killings a [email or web address] Christopher clic en el usuario por Karson Posada? Halle Promise clic aquí en la sesión [facility]. Verá la página de registro [place]. Ingrese ragsdale código de [facility] [place] july y jorje aparece a continuación. Usted no tendrá que [employer] código después de osbaldo completado el proceso de registro . Si usted no se inscribe antes de la fecha de caducidad , debe solicitar un nuevo código. · MyChart Código de acceso : [id number] Expires: 11/27/2018 11:04 AM 
 
Ingresa los últimos cuatro dígitos de ragsdale Número de Seguro Social ( xxxx ) y fecha de nacimiento ( dd / mm / aaaa ) jorje se indica y christopher clic en Enviar. Usted será llevado a la siguiente página de registro . Crear un ID MyChart . Esta será ragsdale ID de inicio de sesión de MyChart y no puede ser [place] , por lo que pensar en eun que es [place] y fácil de recordar . Crear eun contraseña MyChart . Usted puede cambiar ragsdale contraseña en cualquier momento . Ingrese ragsdale Password Reset de preguntas y [place] . Daisetta se puede utilizar en un momento posterior si usted olvida ragsdale contraseña. Introduzca ragsdale dirección de correo electrónico . Kathie Perez recibirá eun notificación por correo electrónico cuando la nueva información está disponible en MyChart . Kaleen Dark clic en Registrarse. Wolff Re javi y descargar porciones de ragsdale expediente médico. 
Aby clic en el enlace de descarga del menú Resumen para descargar uen copia portátil de ragsdale información médica . Si tiene [employer] , por favor visite la sección de preguntas frecuentes del sitio web MyChart . Recuerde, Speekhart NO es que se utilizará para las necesidades urgentes. Para emergencias médicas , llame al 911 . Ahora disponible en ragsdale iPhone y Android ! Por favor proporcione hazel resumen de la documentación de cuidado a ragsdale próximo proveedor. Your primary care clinician is listed as Jayda Bruno. If you have any questions after today's visit, please call [phone].

## 2018-10-09 ENCOUNTER — ROUTINE PRENATAL (OUTPATIENT)
Dept: OBGYN CLINIC | Age: 28
End: 2018-10-09

## 2018-10-09 VITALS
SYSTOLIC BLOOD PRESSURE: 111 MMHG | HEIGHT: 61 IN | HEART RATE: 104 BPM | WEIGHT: 168 LBS | BODY MASS INDEX: 31.72 KG/M2 | DIASTOLIC BLOOD PRESSURE: 73 MMHG

## 2018-10-09 DIAGNOSIS — Z3A.37 37 WEEKS GESTATION OF PREGNANCY: ICD-10-CM

## 2018-10-09 DIAGNOSIS — Z34.83 ENCOUNTER FOR SUPERVISION OF OTHER NORMAL PREGNANCY IN THIRD TRIMESTER: ICD-10-CM

## 2018-10-09 DIAGNOSIS — N89.8 VAGINAL ODOR: ICD-10-CM

## 2018-10-12 LAB
A VAGINAE DNA VAG QL NAA+PROBE: NORMAL SCORE
BVAB2 DNA VAG QL NAA+PROBE: NORMAL SCORE
C ALBICANS DNA VAG QL NAA+PROBE: NEGATIVE
C GLABRATA DNA VAG QL NAA+PROBE: NEGATIVE
MEGA1 DNA VAG QL NAA+PROBE: NORMAL SCORE

## 2018-10-16 ENCOUNTER — ANESTHESIA EVENT (OUTPATIENT)
Dept: LABOR AND DELIVERY | Age: 28
DRG: 560 | End: 2018-10-16
Payer: MEDICAID

## 2018-10-16 ENCOUNTER — HOSPITAL ENCOUNTER (INPATIENT)
Age: 28
LOS: 3 days | Discharge: HOME | DRG: 560 | End: 2018-10-19
Attending: OBSTETRICS & GYNECOLOGY | Admitting: OBSTETRICS & GYNECOLOGY
Payer: MEDICAID

## 2018-10-16 ENCOUNTER — ANESTHESIA (OUTPATIENT)
Dept: LABOR AND DELIVERY | Age: 28
DRG: 560 | End: 2018-10-16
Payer: MEDICAID

## 2018-10-16 LAB
BASOPHILS # BLD: 0 K/UL (ref 0–0.1)
BASOPHILS NFR BLD: 0 % (ref 0–1)
DIFFERENTIAL METHOD BLD: ABNORMAL
EOSINOPHIL # BLD: 0 K/UL (ref 0–0.4)
EOSINOPHIL NFR BLD: 0 % (ref 0–7)
ERYTHROCYTE [DISTWIDTH] IN BLOOD BY AUTOMATED COUNT: 13.4 % (ref 11.5–14.5)
HCT VFR BLD AUTO: 36.3 % (ref 35–47)
HGB BLD-MCNC: 12.1 G/DL (ref 11.5–16)
IMM GRANULOCYTES # BLD: 0.1 K/UL (ref 0–0.04)
IMM GRANULOCYTES NFR BLD AUTO: 1 % (ref 0–0.5)
LYMPHOCYTES # BLD: 1.5 K/UL (ref 0.8–3.5)
LYMPHOCYTES NFR BLD: 14 % (ref 12–49)
MCH RBC QN AUTO: 28.9 PG (ref 26–34)
MCHC RBC AUTO-ENTMCNC: 33.3 G/DL (ref 30–36.5)
MCV RBC AUTO: 86.8 FL (ref 80–99)
MONOCYTES # BLD: 0.6 K/UL (ref 0–1)
MONOCYTES NFR BLD: 6 % (ref 5–13)
NEUTS SEG # BLD: 8.2 K/UL (ref 1.8–8)
NEUTS SEG NFR BLD: 79 % (ref 32–75)
NRBC # BLD: 0 K/UL (ref 0–0.01)
NRBC BLD-RTO: 0 PER 100 WBC
PLATELET # BLD AUTO: 280 K/UL (ref 150–400)
PMV BLD AUTO: 10.5 FL (ref 8.9–12.9)
RBC # BLD AUTO: 4.18 M/UL (ref 3.8–5.2)
WBC # BLD AUTO: 10.4 K/UL (ref 3.6–11)

## 2018-10-16 PROCEDURE — 75410000002 HC LABOR FEE PER 1 HR: Performed by: OBSTETRICS & GYNECOLOGY

## 2018-10-16 PROCEDURE — 85025 COMPLETE CBC W/AUTO DIFF WBC: CPT | Performed by: OBSTETRICS & GYNECOLOGY

## 2018-10-16 PROCEDURE — 76060000078 HC EPIDURAL ANESTHESIA: Performed by: ANESTHESIOLOGY

## 2018-10-16 PROCEDURE — 4A0HXCZ MEASUREMENT OF PRODUCTS OF CONCEPTION, CARDIAC RATE, EXTERNAL APPROACH: ICD-10-PCS | Performed by: OBSTETRICS & GYNECOLOGY

## 2018-10-16 PROCEDURE — 65270000029 HC RM PRIVATE

## 2018-10-16 PROCEDURE — 75410000000 HC DELIVERY VAGINAL/SINGLE: Performed by: OBSTETRICS & GYNECOLOGY

## 2018-10-16 PROCEDURE — 77030014125 HC TY EPDRL BBMI -B: Performed by: ANESTHESIOLOGY

## 2018-10-16 PROCEDURE — 36415 COLL VENOUS BLD VENIPUNCTURE: CPT | Performed by: OBSTETRICS & GYNECOLOGY

## 2018-10-16 PROCEDURE — 00HU33Z INSERTION OF INFUSION DEVICE INTO SPINAL CANAL, PERCUTANEOUS APPROACH: ICD-10-PCS | Performed by: ANESTHESIOLOGY

## 2018-10-16 PROCEDURE — 77030034850

## 2018-10-16 PROCEDURE — 74011000250 HC RX REV CODE- 250

## 2018-10-16 PROCEDURE — 75410000003 HC RECOV DEL/VAG/CSECN EA 0.5 HR: Performed by: OBSTETRICS & GYNECOLOGY

## 2018-10-16 PROCEDURE — 74011250636 HC RX REV CODE- 250/636: Performed by: ANESTHESIOLOGY

## 2018-10-16 PROCEDURE — 75810000275 HC EMERGENCY DEPT VISIT NO LEVEL OF CARE

## 2018-10-16 PROCEDURE — 74011000258 HC RX REV CODE- 258: Performed by: OBSTETRICS & GYNECOLOGY

## 2018-10-16 PROCEDURE — 74011250636 HC RX REV CODE- 250/636: Performed by: OBSTETRICS & GYNECOLOGY

## 2018-10-16 PROCEDURE — 3E0R3BZ INTRODUCTION OF ANESTHETIC AGENT INTO SPINAL CANAL, PERCUTANEOUS APPROACH: ICD-10-PCS | Performed by: ANESTHESIOLOGY

## 2018-10-16 RX ORDER — SODIUM CHLORIDE 0.9 % (FLUSH) 0.9 %
5-10 SYRINGE (ML) INJECTION EVERY 8 HOURS
Status: DISCONTINUED | OUTPATIENT
Start: 2018-10-16 | End: 2018-10-17

## 2018-10-16 RX ORDER — EPHEDRINE SULFATE 50 MG/ML
10 INJECTION, SOLUTION INTRAVENOUS
Status: DISCONTINUED | OUTPATIENT
Start: 2018-10-16 | End: 2018-10-17

## 2018-10-16 RX ORDER — FENTANYL/BUPIVACAINE/NS/PF 2-1250MCG
1-16 PREFILLED PUMP RESERVOIR EPIDURAL CONTINUOUS
Status: DISCONTINUED | OUTPATIENT
Start: 2018-10-17 | End: 2018-10-17

## 2018-10-16 RX ORDER — NALOXONE HYDROCHLORIDE 0.4 MG/ML
0.4 INJECTION, SOLUTION INTRAMUSCULAR; INTRAVENOUS; SUBCUTANEOUS AS NEEDED
Status: DISCONTINUED | OUTPATIENT
Start: 2018-10-16 | End: 2018-10-17

## 2018-10-16 RX ORDER — LIDOCAINE HYDROCHLORIDE AND EPINEPHRINE 20; 5 MG/ML; UG/ML
INJECTION, SOLUTION EPIDURAL; INFILTRATION; INTRACAUDAL; PERINEURAL AS NEEDED
Status: DISCONTINUED | OUTPATIENT
Start: 2018-10-16 | End: 2018-10-17 | Stop reason: HOSPADM

## 2018-10-16 RX ORDER — SODIUM CHLORIDE 0.9 % (FLUSH) 0.9 %
5-10 SYRINGE (ML) INJECTION AS NEEDED
Status: DISCONTINUED | OUTPATIENT
Start: 2018-10-16 | End: 2018-10-17

## 2018-10-16 RX ORDER — SODIUM CHLORIDE, SODIUM LACTATE, POTASSIUM CHLORIDE, CALCIUM CHLORIDE 600; 310; 30; 20 MG/100ML; MG/100ML; MG/100ML; MG/100ML
125 INJECTION, SOLUTION INTRAVENOUS CONTINUOUS
Status: DISCONTINUED | OUTPATIENT
Start: 2018-10-16 | End: 2018-10-17

## 2018-10-16 RX ADMIN — SODIUM CHLORIDE 5 MILLION UNITS: 900 INJECTION, SOLUTION INTRAVENOUS at 20:46

## 2018-10-16 RX ADMIN — LIDOCAINE HYDROCHLORIDE AND EPINEPHRINE 10 ML: 20; 5 INJECTION, SOLUTION EPIDURAL; INFILTRATION; INTRACAUDAL; PERINEURAL at 23:41

## 2018-10-16 RX ADMIN — SODIUM CHLORIDE, SODIUM LACTATE, POTASSIUM CHLORIDE, AND CALCIUM CHLORIDE 125 ML/HR: 600; 310; 30; 20 INJECTION, SOLUTION INTRAVENOUS at 20:45

## 2018-10-16 RX ADMIN — Medication 12 ML/HR: at 23:43

## 2018-10-16 RX ADMIN — SODIUM CHLORIDE, SODIUM LACTATE, POTASSIUM CHLORIDE, AND CALCIUM CHLORIDE 125 ML/HR: 600; 310; 30; 20 INJECTION, SOLUTION INTRAVENOUS at 23:32

## 2018-10-16 NOTE — ED NOTES
Patient reports in labor with second chills, patient adds water has broken and contractions are 5 minutes a part. Patient of Dr. Kevin Rios Spoke to [facility] charge

## 2018-10-17 PROCEDURE — 75410000002 HC LABOR FEE PER 1 HR: Performed by: OBSTETRICS & GYNECOLOGY

## 2018-10-17 PROCEDURE — 74011250637 HC RX REV CODE- 250/637: Performed by: OBSTETRICS & GYNECOLOGY

## 2018-10-17 PROCEDURE — 74011250636 HC RX REV CODE- 250/636: Performed by: OBSTETRICS & GYNECOLOGY

## 2018-10-17 PROCEDURE — 74011250636 HC RX REV CODE- 250/636

## 2018-10-17 PROCEDURE — 65270000029 HC RM PRIVATE

## 2018-10-17 PROCEDURE — 77030021125

## 2018-10-17 RX ORDER — DIPHENHYDRAMINE HCL 25 MG
25 CAPSULE ORAL
Status: DISCONTINUED | OUTPATIENT
Start: 2018-10-17 | End: 2018-10-19 | Stop reason: HOSPADM

## 2018-10-17 RX ORDER — ZOLPIDEM TARTRATE 5 MG/1
5 TABLET ORAL
Status: DISCONTINUED | OUTPATIENT
Start: 2018-10-17 | End: 2018-10-19 | Stop reason: HOSPADM

## 2018-10-17 RX ORDER — OXYTOCIN/RINGER'S LACTATE 20/1000 ML
125-500 PLASTIC BAG, INJECTION (ML) INTRAVENOUS AS NEEDED
Status: DISCONTINUED | OUTPATIENT
Start: 2018-10-17 | End: 2018-10-19 | Stop reason: HOSPADM

## 2018-10-17 RX ORDER — DOCUSATE SODIUM 100 MG/1
100 CAPSULE, LIQUID FILLED ORAL
Status: DISCONTINUED | OUTPATIENT
Start: 2018-10-17 | End: 2018-10-19 | Stop reason: HOSPADM

## 2018-10-17 RX ORDER — OXYTOCIN/0.9 % SODIUM CHLORIDE 30/500 ML
PLASTIC BAG, INJECTION (ML) INTRAVENOUS
Status: COMPLETED
Start: 2018-10-17 | End: 2018-10-17

## 2018-10-17 RX ORDER — NALOXONE HYDROCHLORIDE 0.4 MG/ML
0.4 INJECTION, SOLUTION INTRAMUSCULAR; INTRAVENOUS; SUBCUTANEOUS AS NEEDED
Status: DISCONTINUED | OUTPATIENT
Start: 2018-10-17 | End: 2018-10-19 | Stop reason: HOSPADM

## 2018-10-17 RX ORDER — HYDROMORPHONE HYDROCHLORIDE 2 MG/ML
1 INJECTION, SOLUTION INTRAMUSCULAR; INTRAVENOUS; SUBCUTANEOUS
Status: DISCONTINUED | OUTPATIENT
Start: 2018-10-17 | End: 2018-10-19 | Stop reason: HOSPADM

## 2018-10-17 RX ORDER — HYDROCODONE BITARTRATE AND ACETAMINOPHEN 7.5; 325 MG/1; MG/1
1 TABLET ORAL
Status: DISCONTINUED | OUTPATIENT
Start: 2018-10-17 | End: 2018-10-19 | Stop reason: HOSPADM

## 2018-10-17 RX ORDER — HYDROCORTISONE ACETATE PRAMOXINE HCL 2.5; 1 G/100G; G/100G
CREAM TOPICAL AS NEEDED
Status: DISCONTINUED | OUTPATIENT
Start: 2018-10-17 | End: 2018-10-19 | Stop reason: HOSPADM

## 2018-10-17 RX ORDER — ONDANSETRON 2 MG/ML
4 INJECTION INTRAMUSCULAR; INTRAVENOUS
Status: DISCONTINUED | OUTPATIENT
Start: 2018-10-17 | End: 2018-10-19 | Stop reason: HOSPADM

## 2018-10-17 RX ORDER — ACETAMINOPHEN 325 MG/1
650 TABLET ORAL
Status: DISCONTINUED | OUTPATIENT
Start: 2018-10-17 | End: 2018-10-19 | Stop reason: HOSPADM

## 2018-10-17 RX ORDER — IBUPROFEN 600 MG/1
600 TABLET ORAL
Status: DISCONTINUED | OUTPATIENT
Start: 2018-10-17 | End: 2018-10-19 | Stop reason: HOSPADM

## 2018-10-17 RX ORDER — OXYTOCIN/0.9 % SODIUM CHLORIDE 30/500 ML
500 PLASTIC BAG, INJECTION (ML) INTRAVENOUS ONCE
Status: COMPLETED | OUTPATIENT
Start: 2018-10-17 | End: 2018-10-17

## 2018-10-17 RX ORDER — SIMETHICONE 80 MG
80 TABLET,CHEWABLE ORAL
Status: DISCONTINUED | OUTPATIENT
Start: 2018-10-17 | End: 2018-10-19 | Stop reason: HOSPADM

## 2018-10-17 RX ADMIN — Medication 30000 MILLI-UNITS/HR: at 06:20

## 2018-10-17 RX ADMIN — HYDROCODONE BITARTRATE AND ACETAMINOPHEN 1 TABLET: 7.5; 325 TABLET ORAL at 12:29

## 2018-10-17 RX ADMIN — OXYTOCIN 30000 MILLI-UNITS/HR: 10 INJECTION, SOLUTION INTRAMUSCULAR; INTRAVENOUS at 06:20

## 2018-10-17 RX ADMIN — PENICILLIN G POTASSIUM 2.5 MILLION UNITS: 20000000 POWDER, FOR SOLUTION INTRAVENOUS at 00:41

## 2018-10-17 RX ADMIN — PENICILLIN G POTASSIUM 2.5 MILLION UNITS: 20000000 POWDER, FOR SOLUTION INTRAVENOUS at 05:33

## 2018-10-17 RX ADMIN — IBUPROFEN 600 MG: 600 TABLET ORAL at 09:45

## 2018-10-17 RX ADMIN — HYDROCODONE BITARTRATE AND ACETAMINOPHEN 1 TABLET: 7.5; 325 TABLET ORAL at 21:04

## 2018-10-17 RX ADMIN — IBUPROFEN 600 MG: 600 TABLET ORAL at 17:23

## 2018-10-17 RX ADMIN — SODIUM CHLORIDE, SODIUM LACTATE, POTASSIUM CHLORIDE, AND CALCIUM CHLORIDE 125 ML/HR: 600; 310; 30; 20 INJECTION, SOLUTION INTRAVENOUS at 03:20

## 2018-10-17 NOTE — LACTATION NOTE
This note was copied from a baby's chart. Assisted mother with waking baby, positioning, and latching . Baby very drowsy, baby woke but would not latch. Mother easily expressing big drops of colostrum to baby. BF basics reviewed. Discussed with mother her plan for feeding. Reviewed the benefits of exclusive breast milk feeding during the hospital stay. Informed her of the risks of using formula to supplement in the first few days of life as well as the benefits of successful breast milk feeding; referred her to the Breastfeeding booklet about this information. She acknowledges understanding of information reviewed and states that it is her plan to breastfeed her infant. Will support her choice and offer additional information as needed. Reviewed breastfeeding basics:  How milk is made and normal  breastfeeding behaviors discussed. Supply and demand,  stomach size, early feeding cues, skin to skin bonding with comfortable positioning and baby led latch-on reviewed. How to identify signs of successful breastfeeding sessions reviewed; education on assymetrical latch, signs of effective latching vs shallow, in-effective latching, normal  feeding frequency and duration and expected infant output discussed. .Hand Expression Education:  Emphasized the importance of providing infant with valuable colostrum as infant rests skin to skin at breast.  Aware to avoid extended periods of non-feeding. Aware to offer 10-20+ drops of colostrum every 2-3 hours until infant is latching and nursing effectively. Taught the rationale behind this low tech but highly effective evidence based practice. Pt will successfully establish breastfeeding by feeding in response to early feeding cues  
or wake every 3h, will obtain deep latch, and will keep log of feedings/output. Taught to BF at hunger cues and or q 2-3 hrs and to offer 10-20 drops of hand expressed colostrum at any non-feeds. Breast Assessment Left Breast: Medium Left Nipple: Everted, Intact Right Breast: Medium Right Nipple: Everted, Intact Breast- Feeding Assessment Attends Breast-Feeding Classes: No 
Breast-Feeding Experience: Yes Breast Trauma/Surgery: No 
Type/Quality: Good Lactation Consultant Visits Breast-Feedings: Good Mother/Infant Observation Mother Observation: Breast comfortable, Recognizes feeding cues Infant Observation: Rhythmic suck, Relaxed after feeding, Opens mouth, Lips flanged, upper, Lips flanged, lower, Latches nipple and aereolae, Feeding cues LATCH Documentation Latch: Too sleepy or reluctant, no latch achieved Audible Swallowing: A few with stimulation(mother easily expressing drops to baby) Type of Nipple: Everted (after stimulation) Comfort (Breast/Nipple): Soft/non-tender Hold (Positioning): Full assist, teach one side, mother does other, staff holds LATCH Score: 6

## 2018-10-17 NOTE — PROGRESS NOTES
6556 verbal and bedside report received from KRYSTYNA Soto RN. Patient care assumed at this time. Morning assessment completed. Educated patient not to get out of bed without assistance from RN and the need to measure first two voids. Patient and significant other state understanding.  
 
3780 Patient doing well. Denies need for pain medication. Breastfeeding infant. 0825 Family at bedside. No complaints. 1465 Pads changed and underwear applied. Pericare education performed. Epidural line removed. 1020 TRANSFER - OUT REPORT: 
 
Verbal report given to DEION Hardy RN and Eyal Arana RN (name) on Murali Guardado  being transferred to MIU (unit) for routine progression of care Report consisted of patients Situation, Background, Assessment and  
Recommendations(SBAR). Information from the following report(s) SBAR, Kardex, Intake/Output, MAR, Accordion, Recent Results and Med Rec Status was reviewed with the receiving nurse. Lines:  
Peripheral IV 10/16/18 Right Hand (Active) Site Assessment Clean, dry, & intact 10/17/2018  6:59 AM  
Phlebitis Assessment 0 10/17/2018  6:59 AM  
Infiltration Assessment 0 10/17/2018  6:59 AM  
Dressing Status Clean, dry, & intact 10/17/2018  6:59 AM  
Dressing Type Tape;Transparent 10/17/2018  6:59 AM  
Hub Color/Line Status Pink; Infusing;Patent 10/17/2018  6:59 AM  
Action Taken Blood drawn 10/16/2018  8:45 PM  
Alcohol Cap Used Yes 10/16/2018  8:45 PM  
  
 
Opportunity for questions and clarification was provided. Patient transported with: 
 Registered Nurse Baby bands verified with RN and patient.

## 2018-10-17 NOTE — ROUTINE PROCESS
1920: In bed resting. Shift change report. Advised to feed infant. 2048: Up in bathroom, infant assessment at this time. 2104: Requesting pain medication, cramping and perineum pain. Assessment at this time. 2204: In bed resting. Medication helping but still having pain. Would like to wait to see if medication continues to help, and if not will inform this RN to get heating pack. 2330: Up to bathroom. Just finished breastfeeding. 0011: Returned from bathroom, requesting Motrin and heating pad. 
0100: In bed sleeping. 0145: In bed sleeping. 0220: Infant to nursery for weight. Pt requesting pain medication at this time. 0310: Pain better. Infant back from nursery. Encouraged to feed infant. 2763: In bed resting. 0530: In bed holding infant. Infant to bassinet. Mom to sleep. 
0612: In bed resting. Up to bathroom. Pain medication at this time. 0389: Bedside and Verbal shift change report given to Destinee Dumont RN (oncoming nurse) by Caroline Dela Cruz RN (offgoing nurse). Report given with SBAR, Kardex, Intake/Output and MAR.

## 2018-10-17 NOTE — PROGRESS NOTES
Bedside and Verbal shift change report given to Tanya Harper RN (oncoming nurse) by Pravin Leonardo RN (offgoing nurse). Report included the following information SBAR, Kardex and MAR.

## 2018-10-17 NOTE — H&P
History & Physical 
 
Name: Helga Sousa MRN: [id number]  SSN: [id number] YOB: 1990  Age: 33 y.o. Sex: female Subjective:  
 
Estimated Date of Delivery: 10/30/18 OB History  Para Term  AB Living 2 1 1   1 SAB TAB Ectopic Molar Multiple Live Births 0 1 Ms. Carina Amin is admitted with pregnancy at 38w0d for active labor. Prenatal course was normal. Please see prenatal records for details. Past Medical History:  
Diagnosis Date  Screening for malignant neoplasm of the cervix 2018 Negative (no hpv) Past Surgical History:  
Procedure Laterality Date  HX CHOLECYSTECTOMY  HX WISDOM TEETH EXTRACTION    
 
@ Social History Social History  Marital status:  Spouse name: N/A  
 Number of children: N/A  
 Years of education: N/A Occupational History  Not on file. Social History Main Topics  Smoking status: Never Smoker  Smokeless tobacco: Never Used Comment: No vapor or e-cigs  Alcohol use No  
 Drug use: No  
 Sexual activity: Yes  
  Partners: Male Birth control/ protection: None Other Topics Concern  Not on file Social History Narrative Family History Problem Relation Age of Onset  No Known Problems Other  No Known Problems Mother  No Known Problems Father No Known Allergies Prior to Admission medications Medication Sig Start Date End Date Taking? Authorizing Provider  
prenatal vit37/iron/folic acid (PRENATA PO) Take  by mouth. Yes Historical Provider  
folic acid 439 mcg tablet Take 800 mcg by mouth daily. Yes Historical Provider  
raNITIdine (ZANTAC) 150 mg tablet Take 1 Tab by mouth two (2) times a day. Patient not taking: Reported on 2018 2/15/18   Azul Pretty MD  
  
 
Review of Systems: A comprehensive review of systems was negative except for that written in the HPI. Objective:  
 
Vitals: 
Vitals: 10/16/18 2001 10/16/18 2006 BP:  136/84 Pulse:  98 Resp:  15 Temp:  98.5 °F (36.9 °C) Weight: 162 lb (73.5 kg) Height: 5' 1\" (1.549 m) Physical Exam: 
Chest: clear Heart: RRR Abdomen: Gravid 50% effaced/ 3 cm/-3 Membranes:  Spontaneous Rupture of Membranes; Amniotic Fluid: clear fluid Fetal Heart Rate: Reactive Prenatal Labs:  
Lab Results Component Value Date/Time  
 Rubella, External immune 05/11/2018 GrBStrep, External Positive  09/27/2018 HBsAg, External neg 05/11/2018 HIV, External neg 05/11/2018 Gonorrhea, External neg 05/11/2018 Chlamydia, External neg 05/11/2018 Assessment/Plan:  
 
Plan: Admit for active labor. H/O positive GBS and will start PCN. See prenatal labs. Signed By:  Adina Roa MD   
 October 16, 2018

## 2018-10-17 NOTE — ANESTHESIA PREPROCEDURE EVALUATION
Anesthetic History No history of anesthetic complications Review of Systems / Medical History Patient summary reviewed, nursing notes reviewed and pertinent labs reviewed Pulmonary Within defined limits Neuro/Psych Within defined limits Cardiovascular Within defined limits Exercise tolerance: >4 METS 
  
GI/Hepatic/Renal 
Within defined limits Endo/Other Within defined limits Other Findings Physical Exam 
 
Airway Mallampati: II 
 
Neck ROM: normal range of motion Mouth opening: Normal 
 
 Cardiovascular Regular rate and rhythm,  S1 and S2 normal,  no murmur, click, rub, or gallop Rhythm: regular Rate: normal 
 
 
 
 Dental 
No notable dental hx Pulmonary Breath sounds clear to auscultation Abdominal 
GI exam deferred Other Findings Anesthetic Plan ASA: 2 Anesthesia type: epidural 
 
 
Post-op pain plan if not by surgeon: indwelling epidural catheter Induction: Intravenous Anesthetic plan and risks discussed with: Patient Late entry - preop done, questions answered, and consent obtained prior to procedure; note entered after procedure at 11:44 PM

## 2018-10-17 NOTE — PROGRESS NOTES
10/16/18 
 
8:00 PM 
Patient arrived via wheelchair from the ED with complaints of SROM at 7:30PM of clear fluids. Contractions every 5 min. Denies any vaginal bleeding. + FM. . Patient placed on EFM x 2. Assumed care of pt. Introduced self as Primary RN. Bed locked and in low position with call bell within reach. Plan discussed with pt and understanding was verbalized. Pt denies additional complaints at this time. White board updated with this nurse's name. Patient advised that medical information will be discussed and it is their own reasonability to tell nurse if such conversation should not take place in the presence of visitors. Pt verbalizes understanding. Joanne Ames, RN 
 
8:15 PM 
Nitrazine positive. SVE 3/50/-2. Dr Isidro Perez made aware of patient. 10:45 PM 
Patient requesting epidural. Bolus started. 11:29 PM' Dr Janet Dominguez at bedside for epidural placement. Time out performed. 11:34 PM 
Epidural test dose given by Dr Janet Dominguez. No issues. 2:40 AM 
Dr Isidro Perez updated on patient and reviewed patient strip. No orders received. 6:59 AM 
Bedside shift change report given to Nancy Celaya (oncoming nurse) by Ya Hernandez (offgoing nurse). Report included the following information SBAR, Kardex, Intake/Output, MAR, Recent Results and Med Rec Status. >5

## 2018-10-17 NOTE — PROGRESS NOTES
Delivery Note Patient delivered by .  n/a pounds n/a ounces. Apgars 9 @ one minute and 9 @ five minutes. no episiotomy, no tear, repaired with n/a. Third stage normal. Placenta delivered spontansously. EBL 100cc. Uterus and vagina clear of sponges.

## 2018-10-17 NOTE — ADT AUTH CERT NOTES
Patient Demographics Patient Name Ca Maldonado 
[id number] Sex Female  
1990 Address [address] [place] [address] [place] [address] Phone [phone] (Home) [phone] (Mobile) *Preferred*  
CSN:  
[id number] Admit Date: Admit Time Room Bed Oct 16, 2018  7:52  [46561] 01 [[id number]] Attending Providers Provider Pager From To Diana Payne MD  10/16/18 ADM 10/16 DEL BABY BOY 10/17 VAG Birth History Birth Information Birth Length: 47 cm Birth Weight: 3.005 kg Birth Head Circ: 31.5 cm Gestational Age: 37 3/11 weeks Delivery Method: Vaginal, Spontaneous APGARs 1 Minute: 9  
5 Minute: 9

## 2018-10-17 NOTE — ANESTHESIA PROCEDURE NOTES
Epidural Block Start time: 10/16/2018 11:27 PM 
End time: 10/16/2018 11:46 PM 
Performed by: Owen Luis Manuel Authorized by: Owen Issa Pre-Procedure Indication: labor epidural   
Preanesthetic Checklist: patient identified, risks and benefits discussed, anesthesia consent, patient being monitored, timeout performed and anesthesia consent Timeout Time: 23:27 Epidural:  
Patient position:  Seated Prep region:  Lumbar Prep: Betadine Location:  L3-4 Needle and Epidural Catheter:  
Needle Type:  Tuohy Needle Gauge:  17 G Injection Technique:  Loss of resistance using air Attempts:  1 Catheter Size:  20 G Catheter at Skin Depth (cm):  10 Depth in Epidural Space (cm):  5 Events: no blood with aspiration, no cerebrospinal fluid with aspiration and negative aspiration test   
Test Dose:  Lidocaine 1.5% w/ epi and negative Assessment:  
Catheter Secured:  Tape Insertion:  Uncomplicated Patient tolerance:  Patient tolerated the procedure well with no immediate complications

## 2018-10-17 NOTE — PROGRESS NOTES
10/17/18 9:56 AM 
CM met with SAGE and her /FOB Jyoti Johnson ([phone]) to complete initial assessment and to begin discharge planning. Demographics were reviewed and confirmed. SAGE does not work outside the home, FOB works and will return to work on Monday. The couple also has a daughter who is 3years old. They have a support system of family to assist as needed. SAGE is breastfeeding and has a pump ot use. [facility].[address] will provide medical follow up for the baby. Patient has car seat, crib, clothing, and other necessary supplies. SAGE has Medicaid services and also uses [facility]; FOB was educated on how to add the baby to both services. Care Management Interventions PCP Verified by CM: Yes(Dr. ALEXIS Brand) Mode of Transport at Discharge: Self Transition of Care Consult (CM Consult): Discharge Planning Current Support Network: Lives with Spouse, Family Lives [place] Confirm Follow Up Transport: Family Plan discussed with Pt/Family/Caregiver: Yes Discharge Location Discharge Placement: Home with family assistance [facility] [place]

## 2018-10-18 PROCEDURE — 65270000029 HC RM PRIVATE

## 2018-10-18 PROCEDURE — 77030018846 HC SOL IRR STRL H20 ICUM -A

## 2018-10-18 PROCEDURE — 74011250637 HC RX REV CODE- 250/637: Performed by: OBSTETRICS & GYNECOLOGY

## 2018-10-18 PROCEDURE — 77030021125

## 2018-10-18 RX ADMIN — IBUPROFEN 600 MG: 600 TABLET ORAL at 18:36

## 2018-10-18 RX ADMIN — HYDROCODONE BITARTRATE AND ACETAMINOPHEN 1 TABLET: 7.5; 325 TABLET ORAL at 22:36

## 2018-10-18 RX ADMIN — MUPIROCIN: 20 OINTMENT TOPICAL at 09:06

## 2018-10-18 RX ADMIN — DOCUSATE SODIUM 100 MG: 100 CAPSULE, LIQUID FILLED ORAL at 09:08

## 2018-10-18 RX ADMIN — IBUPROFEN 600 MG: 600 TABLET ORAL at 06:16

## 2018-10-18 RX ADMIN — IBUPROFEN 600 MG: 600 TABLET ORAL at 00:00

## 2018-10-18 RX ADMIN — IBUPROFEN 600 MG: 600 TABLET ORAL at 12:37

## 2018-10-18 RX ADMIN — HYDROCODONE BITARTRATE AND ACETAMINOPHEN 1 TABLET: 7.5; 325 TABLET ORAL at 06:16

## 2018-10-18 RX ADMIN — HYDROCODONE BITARTRATE AND ACETAMINOPHEN 1 TABLET: 7.5; 325 TABLET ORAL at 02:14

## 2018-10-18 NOTE — ROUTINE PROCESS
Bedside and Verbal shift change report given to Keira Farrell RN (oncoming nurse) by Ledy Adams RN (offgoing nurse). Report included the following information SBAR, Kardex, Procedure Summary, Intake/Output, MAR and Recent Results.

## 2018-10-18 NOTE — LACTATION NOTE
This note was copied from a baby's chart. Went in for visit with mother, visitors at bedside as well as dad who was able to translate for mother. Mother states baby is nursing well and she does not need any assistance. Encouraged mother to call [facility] for assistance with feeding if needed, baby not at breast at this time, being held by visitor.

## 2018-10-19 VITALS
OXYGEN SATURATION: 100 % | SYSTOLIC BLOOD PRESSURE: 113 MMHG | HEIGHT: 61 IN | HEART RATE: 86 BPM | RESPIRATION RATE: 16 BRPM | BODY MASS INDEX: 30.58 KG/M2 | TEMPERATURE: 98.3 F | WEIGHT: 162 LBS | DIASTOLIC BLOOD PRESSURE: 65 MMHG

## 2018-10-19 PROCEDURE — 74011250637 HC RX REV CODE- 250/637: Performed by: OBSTETRICS & GYNECOLOGY

## 2018-10-19 RX ADMIN — IBUPROFEN 600 MG: 600 TABLET ORAL at 10:42

## 2018-10-19 RX ADMIN — IBUPROFEN 600 MG: 600 TABLET ORAL at 04:38

## 2018-10-19 RX ADMIN — HYDROCODONE BITARTRATE AND ACETAMINOPHEN 1 TABLET: 7.5; 325 TABLET ORAL at 04:38

## 2018-10-19 NOTE — LACTATION NOTE
This note was copied from a baby's chart. Pt will successfully establish breastfeeding by feeding in response to early feeding cues  
or wake every 3h, will obtain deep latch, and will keep log of feedings/output. Taught to BF at hunger cues and or q 2-3 hrs and to offer 10-20 drops of hand expressed colostrum at any non-feeds. Breast Assessment Left Breast: Small , Medium, Engorged Left Nipple: Intact, Everted Right Breast: Small , Medium, Engorged Right Nipple: Intact, Everted Breast- Feeding Assessment Attends Breast-Feeding Classes: No(All BF ed via [employer]  #[id number], BF basics reviewed, how milk is made for 2nd BF child, normal  BF behaviors all reviewed, questions answered.) Breast-Feeding Experience: Yes(1st child x 5.5+ mo) Breast Trauma/Surgery: No 
Type/Quality: Good Lactation Consultant Visits Breast-Feedings: Good Mother/Infant Observation Mother Observation: Alignment, Breast comfortable, Nipple round on release, Lets baby end feeding, Sleepy after feeding, Close hold, Recognizes feeding cues, Thirst 
Infant Observation: Lips flanged, lower, Rhythmic suck, Feeding cues, Lips flanged, upper, Opens mouth, Latches nipple and aereolae, Relaxed after feeding, Audible swallows LATCH Documentation Latch: Grasps breast, tongue down, lips flanged, rhythmic sucking Audible Swallowing: Spontaneous and intermittent (24 hours old) Type of Nipple: Everted (after stimulation) Comfort (Breast/Nipple): Filling, red/small blisters/bruises, mild/mod discomfort Hold (Positioning): No assist from staff, mother able to position/hold infant(Plans for home discussed, anticipatory guidance shared) LATCH Score: 9 Engorgement Care Guidelines:  Reviewed how milk is made and normal phases of milk production. Taught care of engorged breasts - frequent breastfeeding encouraged, cool packs and motrin as tolerated. Anticipatory guidance shared. Biological Nurturing breastfeeding principles taught. How Biological Nurturing (BN) promotes optimal breastfeeding (BF) sessions discussed. Mother encouraged to seek comfortable semi-reclining breastfeeding positions. Infant placed frontally along maternal contour. Primitive innate feeding reflexes/behaviors of the  discussed. BN tips and techniques shared; assisted with comfortable breastfeeding positioning. Reviewed breastfeeding basics:  How milk is made and normal  breastfeeding behaviors discussed. Supply and demand,  stomach size, early feeding cues, skin to skin bonding with comfortable positioning and baby led latch-on reviewed. How to identify signs of successful breastfeeding sessions reviewed; education on assymetrical latch, signs of effective latching vs shallow, in-effective latching, normal  feeding frequency and duration and expected infant output discussed. Normal course of breastfeeding discussed including the AAP's recommendation that children receive exclusive breast milk feedings for the first six months of life with breast milk feedings to continue through the first year of life and/or beyond as complimentary table foods are added. Breastfeeding Booklet and Warm line information provided with discussion. Discussed typical  weight loss and the importance of pediatrician appointment within 24-48 hours of discharge, at 2 weeks of life and normalcy of requesting pediatric weight checks as needed in between visits. Discharge:  Successful breast feeding session(s) observed. Infant's voids and stools are appropriate for age. Mom verbalizes and demonstrates gained breastfeeding skills. Importance of monitoring feedings and output on first week breastfeeding log reviewed. Aware to follow up with pediatrician within 1-2 days of discharge for pediatric assessment and review.   Encouraged to call warm line number for any questions/concerns that may arise.

## 2018-10-19 NOTE — PROGRESS NOTES
Post-Partum Day Number 2 Progress/Discharge Note Patient doing well post-partum without significant complaint. She is voiding without difficulty, she reports normal lochia. She is ambulatiing without dizziness. Her pain is well controlled with oral pain medication. She is tolerating general diet. Vitals:   
Patient Vitals for the past 8 hrs: 
 BP Temp Pulse Resp 10/19/18 0437 113/65 98.3 °F (36.8 °C) 86 16 Temp (24hrs), Av.6 °F (37 °C), Min:98.3 °F (36.8 °C), Max:99.1 °F (37.3 °C) Exam:  Patient without distress. Lungs:  CTA bilaterally CV: RRR with no rubs or gallops; no murmur Abdomen soft, fundus firm at level of umbilicus, nontender Lower extremities are negative for cords or tenderness; no swelling. Labs: No results found for this or any previous visit (from the past 24 hour(s)). Lab Results Component Value Date/Time  
 Rubella, External immune 2018 GrBStrep, External Positive  2018 HBsAg, External neg 2018 HIV, External neg 2018 Gonorrhea, External neg 2018 Chlamydia, External neg 2018 Assessment and Plan:    Postpartum Day #2, S/P . Doing well. - d/c home  - F/U 6wk postpartum check, sooner prn 
 - OTC Motrin/Tylenol prn

## 2018-10-19 NOTE — ROUTINE PROCESS
Bedside and Verbal shift change report given to Francois Villavicencio RN (oncoming nurse) by Keira Farrell RN (offgoing nurse). Report given with SBAR, Kardex, Intake/Output and MAR.

## 2018-10-19 NOTE — DISCHARGE INSTRUCTIONS
POST DELIVERY DISCHARGE INSTRUCTIONS    Name: Zechariah Garcia  YOB: 1990      General:     Diet/Diet Restrictions:  Eight 8-ounce glasses of fluid daily (primarily water); avoid excessive caffeine intake. Meals/snacks as desired which are high in fiber and complex carbohydrates and low in fat and cholesterol. Physical Activity / Restrictions / Safety:     Avoid heavy lifting, no more that 15 lbs. For 2-3 weeks; No driving while taking narcotic pain medication. Post  patients should not drive until pain free. No intercourse until seen for your 6 week postpartum visit, no douching or tampon use. No swimming or tub baths for 6 weeks. May resume exercise in 4-6 weeks. Discharge Instructions/Special Treatment/Home Care Needs:     Continue prenatal vitamins. Continue to use squirt bottle with warm water on your episiotomy after each bathroom use until bleeding stops. If steri-strips applied to your incision, remove in 14 days. Take stool softeners daily. Call your doctor for the following:     Fever over 101 degrees by mouth. Vaginal bleeding heavier than a normal menstrual period or lost larger than a golf ball. Red streaks or increased swelling of legs, painful red streaks on your breast.  Painful urination, or increased pain, redness or discharge with your incision. Pain Management:     Pain Management:   Take Acetaminophen (Tylenol) or Ibuprofen (Advil, Motrin), as directed for pain. Use a warm Sitz bath 3 times daily to relieve episiotomy or hemorrhoidal discomfort. Heating pad to  incision as needed. For hemorrhoidal discomfort, use Tucks and Anusol cream as needed and directed.     Follow-Up Care:     Appointment with MD:   Follow-up Appointments   Procedures    FOLLOW UP VISIT Appointment in: 6 Weeks     Standing Status:   Standing     Number of Occurrences:   1     Order Specific Question:   Appointment in     Answer:   6 Weeks     Telephone number: [phone]    Signed By: Jeff Davidson MD                                                                                                   Date: 10/19/2018 Time: 9:04 AM

## 2018-10-19 NOTE — PROGRESS NOTES
Patient discharge instructions given using  #[id number] on  phone. Verbalized understanding. All questions answered. No distress noted. E-sign copy of discharge instructions in electronic chart.

## 2018-10-22 NOTE — DISCHARGE SUMMARY
Obstetrical Discharge Summary     Name: Marko Parsons MRN: [id number]  SSN: [id number]    YOB: 1990  Age: 33 y.o. Sex: female      Admit Date: 10/16/2018    Discharge Date: 10/19/2018 12:35 PM    Admitting Physician: Anthony Velasco MD     Attending Physician:  Felicity att. providers found     Admission Diagnoses: Pregnancy    Procedures for this admission:     Discharge Diagnoses:   Information for the patient's :  Magy Nice, Female [[id number]]   Delivery of a 6 lb 10 oz (3.005 kg) female infant via Vaginal, Spontaneous on 10/17/2018 at 6:17 AM  by . Apgars were 9 and 9. Discharge Condition: good    Discharge disposition: Home. OTC Tylenol/Motrin. Hospital Course: Normal hospital course following the delivery. Patient Instructions:   Cannot display discharge medications since this patient is not currently admitted. Reference my discharge instructions.     Follow-up Appointments   Procedures    FOLLOW UP VISIT Appointment in: 6 Weeks     Standing Status:   Standing     Number of Occurrences:   1     Order Specific Question:   Appointment in     Answer:   6 Weeks        Signed By:  Isatu Douglas MD     2018

## 2018-12-06 ENCOUNTER — OFFICE VISIT (OUTPATIENT)
Dept: OBGYN CLINIC | Age: 28
End: 2018-12-06

## 2018-12-06 VITALS
HEIGHT: 61 IN | SYSTOLIC BLOOD PRESSURE: 112 MMHG | DIASTOLIC BLOOD PRESSURE: 73 MMHG | BODY MASS INDEX: 29.07 KG/M2 | WEIGHT: 154 LBS | HEART RATE: 93 BPM

## 2018-12-06 DIAGNOSIS — N89.8 VAGINAL DISCHARGE: ICD-10-CM

## 2018-12-06 LAB
PH BODY FLUID, POCT, PHBFPOCT: 5.5
SOURCE POCT, SRCEPOCT: ABNORMAL
WET MOUNT POCT, WMPOCT: NEGATIVE

## 2018-12-06 RX ORDER — ACETAMINOPHEN AND CODEINE PHOSPHATE 120; 12 MG/5ML; MG/5ML
1 SOLUTION ORAL DAILY
Qty: 3 PACKAGE | Refills: 2 | Status: SHIPPED | OUTPATIENT
Start: 2018-12-06 | End: 2022-04-21

## 2018-12-06 NOTE — PROGRESS NOTES
Postpartum evaluation Radha Iverson is a 33 y.o. female who presents for a postpartum exam.  
 
She is now seven weeks post normal spontaneous vaginal delivery on 10/17/18 (Dr. Ralph Beebe) Her baby is doing well. She has had no menses since delivery. She has had the following significant problems since her delivery:  
Some vaginal discharge, white/clear. Increasing this week. No itching, irritation, odor. The patient is breastfeeding with some difficulty, has to substitute with formula. Nursing baby every 2-3hrs during day only, not nursing overnight. Stopped breastfeeding overnight because it was aggravating back pain, was having trouble sleeping. Some nipple pain as well. Has Cam Medici, needs refill. The patient would like to use for birth control pills, discuss options. She is currently taking: PNVs.  
 
She is due for her next AE in May 2019. Visit Vitals /73 Pulse 93 Ht 5' 1\" (1.549 m) Wt 154 lb (69.9 kg) Breastfeeding? Yes  
BMI 29.10 kg/m² PHYSICAL EXAMINATION Constitutional 
· Appearance: well-nourished, well developed, alert, in no acute distress HENT 
· Head and Face: appears normal 
 
Neck · Inspection/Palpation: normal appearance, no masses or tenderness · Lymph Nodes: no lymphadenopathy present · Thyroid: gland size normal, nontender, no nodules or masses present on palpation Breasts · Inspection of Breasts: breasts symmetrical, no skin changes, no discharge present, nipple appearance normal, no skin retraction present · Palpation of Breasts and Axillae: no masses present on palpation, no breast tenderness · Axillary Lymph Nodes: no lymphadenopathy present Gastrointestinal 
· Abdominal Examination: abdomen non-tender to palpation, normal bowel sounds, no masses present · Liver and spleen: no hepatomegaly present, spleen not palpable · Hernias: no hernias identified Genitourinary · External Genitalia: normal appearance for age, no discharge present, no tenderness present, no inflammatory lesions present, no masses present, no atrophy present · Vagina: normal vaginal vault without central or paravaginal defects, scant thin clear/white discharge without odor present, no inflammatory lesions present, no masses present · Bladder: non-tender to palpation · Urethra: appears normal 
· Cervix: normal  
· Uterus: normal size, shape and consistency · Adnexa: no adnexal tenderness present, no adnexal masses present · Perineum: perineum within normal limits, no evidence of trauma, no rashes or skin lesions present · Anus: anus within normal limits, no hemorrhoids present · Inguinal Lymph Nodes: no lymphadenopathy present Skin · General Inspection: no rash, no lesions identified Neurologic/Psychiatric · Mental Status: · Orientation: grossly oriented to person, place and time · Mood and Affect: mood normal, affect appropriate Results for orders placed or performed in visit on 12/06/18 AMB POC SMEAR, STAIN & INTERPRET, WET MOUNT Result Value Ref Range Wet mount (POC) negative AMB POC PH, BODY FLUID EXCEPT BLOOD Result Value Ref Range  
 pH,Body fluid (POC) 5.5 Source Assessment: 
Normal postpartum check C/o vaginal discharge. Scant d/c on exam.  WP/KOH neg, vag pH slightly elevated Some nipple pain with nursing Plan: RTO for AE. Due 5/2019 Rx for contraception: micronor #3 RFx2. Can begin now. Backup method until second pack 
nuswab BV/yeast 
[employer] Orders Placed This Encounter  NUSWAB VAGINOSIS + CANDIDA  AMB POC SMEAR, STAIN & INTERPRET, WET MOUNT  AMB POC PH, BODY FLUID EXCEPT BLOOD  norethindrone (MICRONOR) 0.35 mg tab Sig: Take 1 Tab by mouth daily. Dispense:  3 Package Refill:  2  
 nipple ointment (DEVANTE LUTZ'S FORMULA) Sig: Apply  to affected area as needed. Dispense:  45 g Refill:  3

## 2018-12-06 NOTE — PATIENT INSTRUCTIONS
El período posparto: Instrucciones de cuidadoInstrucciones de cuidado - [ Postpartum: Care Instructions ] Instrucciones de cuidado Después del parto (período posparto), [place] cuerpo pasa por muchos cambios. Algunos de estos cambios suceden chang varias semanas. [address], el cuerpo comienza a recuperarse y se prepara para el amamantamiento. Es posible que [address] se sienta sensible. Las hormonas pueden cambiar [place] estado de ánimo sin advertencia y sin motivo aparente. Chang las primeras [address] después del parto, muchas mujeres tienen emociones que [place] de zaidi a naeem. Es posible que le resulte difícil dormir. Es posible que llore mucho. Shelly se llama \"melancolía de la maternidad\". Estas emociones abrumadoras suelen desaparecer dentro de unos días o semanas. Sintia es importante hablar con [place] médico acerca de lisette sentimientos. Chang las primeras semanas después del [place], es fácil cansarse demasiado o sentirse [place]. No christopher demasiados esfuerzos. Descanse cada vez que pueda, acepte la ayuda de los demás, coma jessica y aleksandar abundantes líquidos. Entre 4 y 6 semanas después del nacimiento de ragsdale bebé, tendrá eun consulta de seguimiento con [place] médico. Esta visita es [place] oportunidad para hablar con [place] médico sobre cualquier inquietud o basil que tenga. La atención de seguimiento es eun parte clave de r[place] tratamiento y seguridad. Asegúrese de hacer y acudir a todas las citas, y llame a [place] médico si está teniendo problemas. También es eun buena idea saber los resultados de lisette exámenes y mantener eun lista de los medicamentos que geo. Cómo puede cuidarse en el hogar? · Duerma o descanse cuando r[place] bebé lo christopher. · Si es posible, permita que lisette familiares o lisette amigos la ayuden con las tareas del hogar. No intente hacerlo todo usted obed.  
· Si tiene hemorroides o hinchazón o dolor alrededor de la abertura de la vagina, pruebe aplicarse frío y calor. Puede aplicarse hielo o eun compresa fría en la radha chang 10 a 20 minutos cada vez. Póngase un paño dillard entre el hielo y la piel. Además, trate de sentarse en algunos centímetros de agua tibia (baño de asiento) 3 veces al día y después de las evacuaciones. · [employer] analgésicos (medicamentos para el dolor) exactamente según las indicaciones. ? Si el médico le recetó un analgésico, tómelo según las indicaciones. ? Si no está tomando un analgésico recetado, pregúntele a [place] médico si puede christiane zeynep de [employer]. · Coma más fibra para evitar el estreñimiento. Incluya alimentos jorje panes y cereales integrales, verduras crudas, frutas crudas y secas, y frijoles (habichuelas). · Celeste abundantes líquidos, los suficientes jorje para que [place] orina sea de color amarillo pawel o transparente jorje el agua. Si tiene [employer], del corazón o del hígado y tiene que [place]'s líquidos, hable con [place] médico antes de aumentar r[place] consumo. · No se lave el interior de la vagina con líquidos (lavados vaginales). · Si tiene puntos de sutura, mantenga la radha limpia con agua tibia, ya sea echándola o rociándola sobre la radha externa de la vagina y el ano después de usar el baño. · Aby eun lista de preguntas y llévela a [place] consulta posparto. Bibi preguntas podrían tratar sobre: 
? [employer] senos, jorje bultos o sensibilidad. ? Cuándo esperar que regrese [place] período menstrual. 
? Qué forma de anticoncepción es la más adecuada para usted. ? El peso que aumentó chang el Yomi Condon. ? Las opciones de [address]. ? [place] y bebidas son mejores para usted, sobre todo si está amamantando. ? Problemas que podría tener con la lactancia. ? Cuándo puede [employer]. Algunas mujeres july vez quieran hablar sobre lubricantes vaginales. ? Cualquier sentimiento de tristeza o inquietud que tenga. Cuándo debe pedir ayuda? Llame al 911 en cualquier momento que considere que necesita atención de [place]. Por ejemplo, llame si: 
  · Tiene pensamientos sobre hacerse daño a sí misma, a [place] bebé o a otra persona.  
  · Se desmayó (perdió el conocimiento).  
 Llame a [place] médico ahora mismo o busque atención médica inmediata si: 
  · [place] sangrado vaginal parece hacerse más abundante.  
  · Se siente mareada o aturdida, o que está a punto de [place].  
  · Tiene fiebre.  
 Preste especial atención a los cambios en [place] y asegúrese de comunicarse con [place] médico si: 
  · Tiene nuevo flujo vaginal o hazel empeora.  
  · Se siente naeem o deprimida.  
  · Tiene problemas con los senos o el amamantamiento. Dónde puede encontrar más información en inglés? Yeison Sang a http://all-yolis.info/. Nicole Jaycob R671 en la búsqueda para aprender más acerca de \"El período posparto: Instrucciones de cuidadoInstrucciones de cuidado - [ Postpartum: Care Instructions ]. \" 
Revisado: 21 noviembre, 2017 Versión del contenido: 11.8 © 1810-1998 Healthwise, Incorporated. Las instrucciones de cuidado fueron adaptadas bajo licencia por [employer] (which disclaims liability or warranty for this information). Si usted tiene [place] afección médica o sobre estas instrucciones, siempre pregunte a [place] profesional de [place]. Healthwise, Incorporated niega toda garantía o responsabilidad por [place] uso de esta información.

## 2018-12-10 LAB
A VAGINAE DNA VAG QL NAA+PROBE: NORMAL SCORE
BVAB2 DNA VAG QL NAA+PROBE: NORMAL SCORE
C ALBICANS DNA VAG QL NAA+PROBE: NORMAL
C GLABRATA DNA VAG QL NAA+PROBE: NORMAL
MEGA1 DNA VAG QL NAA+PROBE: NORMAL SCORE

## 2022-03-10 ENCOUNTER — INITIAL PRENATAL (OUTPATIENT)
Dept: OBGYN CLINIC | Age: 32
End: 2022-03-10

## 2022-03-10 VITALS
SYSTOLIC BLOOD PRESSURE: 109 MMHG | HEART RATE: 117 BPM | BODY MASS INDEX: 29.66 KG/M2 | DIASTOLIC BLOOD PRESSURE: 65 MMHG | WEIGHT: 157 LBS

## 2022-03-10 DIAGNOSIS — Z34.80 SUPERVISION OF OTHER NORMAL PREGNANCY: Primary | ICD-10-CM

## 2022-03-10 DIAGNOSIS — N92.6 MISSED MENSES: ICD-10-CM

## 2022-03-10 DIAGNOSIS — O09.30 LATE PRENATAL CARE: ICD-10-CM

## 2022-03-10 DIAGNOSIS — Z34.80 SUPERVISION OF OTHER NORMAL PREGNANCY, ANTEPARTUM: ICD-10-CM

## 2022-03-10 PROBLEM — Z34.90 PREGNANCY: Status: ACTIVE | Noted: 2022-03-10

## 2022-03-10 PROBLEM — Z34.90 PREGNANCY: Status: RESOLVED | Noted: 2018-05-18 | Resolved: 2022-03-10

## 2022-03-10 LAB
NIPT, EXTERNAL: NORMAL
NIPT, EXTERNAL: NORMAL

## 2022-03-10 PROCEDURE — 0500F INITIAL PRENATAL CARE VISIT: CPT | Performed by: OBSTETRICS & GYNECOLOGY

## 2022-03-10 RX ORDER — PROMETHAZINE HYDROCHLORIDE 25 MG/1
25 TABLET ORAL
Qty: 30 TABLET | Refills: 1 | Status: SHIPPED | OUTPATIENT
Start: 2022-03-10 | End: 2022-04-21

## 2022-03-10 RX ORDER — DOXYLAMINE SUCCINATE AND PYRIDOXINE HYDROCHLORIDE, DELAYED RELEASE TABLETS 10 MG/10 MG 10; 10 MG/1; MG/1
TABLET, DELAYED RELEASE ORAL
Qty: 120 TABLET | Refills: 2 | Status: SHIPPED | OUTPATIENT
Start: 2022-03-10 | End: 2022-04-21

## 2022-03-10 RX ORDER — ONDANSETRON 4 MG/1
4 TABLET, FILM COATED ORAL
Qty: 20 TABLET | Refills: 0 | Status: SHIPPED | OUTPATIENT
Start: 2022-03-10 | End: 2022-04-21

## 2022-03-10 NOTE — PROGRESS NOTES
Current pregnancy history:    Evan Kwong is a 34 y.o. female who presents for the evaluation of pregnancy. No LMP recorded. (Menstrual status: Postpartum). LMP history:  The date of her LMP is not too certain. Her menstrual cycles are regular and occur approximately every 28 days and range from 3 to 5 days. The last menses did last the usual number of days. A urine pregnancy test was positive 8 weeks ago. She was not on the pill at conception. She waited to do preg test at home, because her menses are always irregular. Based on her LMP her EGA is 22 weeks and 2 days giving an EDC of 22. Ultrasound data:  She had an ultrasound done by the ultrasound tech today which revealed a viable osullivan pregnancy with a gestational age of 25 weeks and 2 days giving an [address] of 22. Ultrasound details:    FETAL SURVEY  A SINGLE VIABLE IUP AT 22W2D GA BY LMP IS SEEN. FETAL CARDIAC MOTION OBSERVED. FETAL ANATOMY WELL VISUALIZED AND APPEARS WITHIN NORMAL LIMITS. NO ABNORMALITIES ARE SEEN ON TODAY'S EXAM.  FACE, NOSE/LIPS, PROFILE, CSP, LAT VENT, CER/CM, CP, SPINE, KIDNEYS, STOMACH/DIAPHRAGM, BLADDER, 3VC,  CORD INSERTION, RVOT, LVOT, 4CH, AO, DA, 3VV, ARMS, LEGS, HANDS, FEET. APPROPRIATE FETAL GROWTH IS SEEN. SIZE=DATES. BRADY, CERVIX AND PLACENTA APPEAR WITHIN NORMAL LIMITS. GENDER: XX    Pregnancy symptoms:    Since her LMP she has experienced  urinary frequency, breast tenderness, fatigue and nausea. She had been vomiting over the last few weeks. Not as bad  Associated signs and symptoms which she denies: dysuria, discharge, vaginal bleeding. She states she has gained weight:  Approximately 17 pounds over the last couple of months. Relevant past pregnancy history:  She has the following pregnancy history: 2 normal   She has no history of  delivery. Relevant past medical history:(relevant to this pregnancy): noncontributory.        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 Substance history:  Negative for alcohol, tobacco and street drugs. Positive for nothing. Exposure history: There is/are no indoor cat/s in the home. The patient was instructed to not change the cat litter. She admits close contact with children on a regular basis. She has had chicken pox or the vaccine in the past.   Patient denies issues with domestic violence. Genetic Screening/Teratology Counseling: (Includes patient, baby's father, or anyone in either family with:)  3.  Patient's age >/= 28 at [facility]?-- no  .   2. Thalassemia ([facility], [place], [address], or  background): MCV<80?--no.     3.  Neural tube defect (meningomyelocele, spina bifida, anencephaly)?--no.   4.  Congenital heart defect?--no.  5.  Down syndrome?--no.   6.  Maurice-Sachs (Jainism, [place])?--no.   7.  Canavan's Disease?--no.   8.  Familial Dysautonomia?--no.   9.  Sickle cell disease or trait ()? --no   The patient has not been tested for sickle trait  10. Hemophilia or other blood disorders?--no. 11.  Muscular dystrophy?--no. 12.  Cystic fibrosis?--no. 13.  Sycamore's Chorea?--no. 14.  Mental retardation/autism (if yes was person tested for Fragile X)?--no. 15.  Other inherited genetic or chromosomal disorder?--no. 12.  Maternal metabolic disorder (DM, PKU, etc)?--no. 17.  Patient or FOB with a child with a birth defect not listed above?--no.  17a. Patient or FOB with a birth defect themselves?--no. 18.  Recurrent pregnancy loss, or stillbirth?--no. 19.  Any medications since LMP other than prenatal vitamins (include vitamins,  supplements, OTC meds, drugs, alcohol)?--no. 20.  Any other genetic/environmental exposure to discuss?--no. Infection History:  1.   Lives with someone with TB or TB exposed?--no.   2.  Patient or partner has history of genital herpes?--no.  3.  Rash or viral illness since LMP?--no.    4.  History of STD (GC, CT, HPV, syphilis, HIV)? --no   5. Other: OTHER? Past Medical History:   Diagnosis Date    Screening for malignant neoplasm of the cervix 05/11/2018    Negative (no hpv)      Past Surgical History:   Procedure Laterality Date    HX CHOLECYSTECTOMY      HX WISDOM TEETH EXTRACTION       Social History     Occupational History    Not on file   Tobacco Use    Smoking status: Never Smoker    Smokeless tobacco: Never Used    Tobacco comment: No vapor or e-cigs    Substance and Sexual Activity    Alcohol use: No     Alcohol/week: 0.0 standard drinks    Drug use: No    Sexual activity: Not Currently     Partners: Male     Birth control/protection: None     Family History   Problem Relation Age of Onset    No Known Problems Other     No Known Problems Mother     No Known Problems Father        No Known Allergies  Prior to Admission medications    Medication Sig Start Date End Date Taking? Authorizing Provider   prenatal vit37/iron/folic acid (PRENATA PO) Take  by mouth. Yes Provider, Historical   norethindrone (MICRONOR) 0.35 mg tab Take 1 Tab by mouth daily. Patient not taking: Reported on 3/10/2022 12/6/18   Augustus Walsh MD   nipple ointment [facility] LUTZ'S FORMULA) Apply  to affected area as needed.   Patient not taking: Reported on 3/10/2022 12/6/18   Augustus Walsh MD        Review of Systems: History obtained from the patient  Constitutional: negative for weight loss, fever, night sweats  HEENT: negative for hearing loss, earache, congestion, snoring, sorethroat  CV: negative for chest pain, palpitations, edema  Resp: negative for cough, shortness of breath, wheezing  Breast: negative for breast lumps, nipple discharge, galactorrhea  GI: negative for change in bowel habits, abdominal pain, black or bloody stools  : negative for frequency, dysuria, hematuria, vaginal discharge  MSK: negative for back pain, joint pain, muscle pain  Skin: negative for itching, rash, hives  Neuro: negative for dizziness, headache, confusion, weakness  Psych: negative for anxiety, depression, change in mood  Heme/lymph: negative for bleeding, bruising, pallor    Objective:  Visit Vitals  /65   Pulse (!) 117   Wt 157 lb (71.2 kg)   BMI 29.66 kg/m²       Physical Exam:   PHYSICAL EXAMINATION    Constitutional  · Appearance: well-nourished, well developed, alert, in no acute distress    HENT  · Head  · Face: appears normal  · Eyes: appear normal  · Ears: normal  · Mouth: normal  · Lips: no lesions    Neck  · Inspection/Palpation: normal appearance, no masses or tenderness  · Lymph Nodes: no lymphadenopathy present  · Thyroid: gland size normal, nontender, no nodules or masses present on palpation    Chest  · Respiratory Effort: breathing unlabored  · Auscultation: normal breath sounds    Cardiovascular  · Heart:  · Auscultation: regular rate and rhythm without murmur    Breasts  · Inspection of Breasts: breasts symmetrical, no skin changes, no discharge present, nipple appearance normal, no skin retraction present  · Palpation of Breasts and Axillae: no masses present on palpation, no breast tenderness  · Axillary Lymph Nodes: no lymphadenopathy present    Gastrointestinal  · Abdominal Examination: abdomen non-tender to palpation, normal bowel sounds, no masses present; gravid uterus, FH=23  · Liver and spleen: no hepatomegaly present, spleen not palpable  · Hernias: no hernias identified    Genitourinary  · External Genitalia: normal appearance for age, no discharge present, no tenderness present, no inflammatory lesions present, no masses present, no atrophy present  · Vagina: normal vaginal vault without central or paravaginal defects, no discharge present, no inflammatory lesions present, no masses present  · Bladder: non-tender to palpation  · Urethra: appears normal  · Cervix: normal; lg/th/cl  · Uterus: enlarged, normal shape, soft  · Adnexa: no adnexal tenderness present, no adnexal masses present  · Perineum: perineum within normal limits, no evidence of trauma, no rashes or skin lesions present  · Anus: anus within normal limits, no hemorrhoids present  · Inguinal Lymph Nodes: no lymphadenopathy present    Skin  · General Inspection: no rash, no lesions identified    Neurologic/Psychiatric  · Mental Status:  · Orientation: grossly oriented to person, place and time  · Mood and Affect: mood normal, affect appropriate    Assessment:   Intrauterine pregnancy:  - U=D. LMP for dating: 10/5/21 -> ALISSA=7/5/22  - late [facility] @ 22wks  - Panorama, Horizon, AFP with NOB labs today. - some N/V/GERD -> eRX phenergan, zofran, diclegis; rec trial of OTC reflux meds  - RHETT 4wks with 1hr        Plan:     · Offered CF testing, MSAFP, amnio, and discussed NIPT  · Course of pregnancy discussed including visit schedule, routine U/S, glucola testing, etc.  · Avoid alcoholic beverages and illicit/recreational drugs use  · Take prenatal vitamins or folic acid daily. · Hospital and practice style discussed with coverage system. · Discussed nutrition, toxoplasmosis precautions, sexual activity, exercise, need for influenza vaccine, environmental and work hazards, travel advice, screen for domestic violence, need for seat belts. · Discussed seafood, unpasteurized dairy products, deli meat, artificial sweeteners, and caffeine. · Information on prenatal classes/breastfeeding given. · Patient encouraged not to smoke. · Discussed current prescription drug use. Given medication list.  · Discussed the use of over the counter medications and chemicals. · Pt understands risk of hemorrhage during pregnancy and post delivery and would accept blood products if necessary in life-threatening emergencies      Handouts given to pt.     Orders Placed This Encounter    CULTURE, URINE     Standing Status:   Future     Standing Expiration Date:   3/9/2023    HEP B SURFACE AG     Standing Status:   Future     Number of Occurrences:   1     Standing Expiration Date:   3/9/2023    HIV SCREEN, 4TH GEN. W/REFLEX CONFIRM     Standing Status:   Future     Number of Occurrences:   1     Standing Expiration Date:   3/9/2023    CBC W/O DIFF     Standing Status:   Future     Number of Occurrences:   1     Standing Expiration Date:   3/9/2023    RUBELLA AB, IGG     Standing Status:   Future     Number of Occurrences:   1     Standing Expiration Date:   3/9/2023    RPR     Standing Status:   Future     Number of Occurrences:   1     Standing Expiration Date:   3/9/2023    HCV AB W/RFLX TO MICAH     Standing Status:   Future     Number of Occurrences:   1     Standing Expiration Date:   3/9/2023    CT/NG/T.VAGINALIS AMPLIFICATION     Order Specific Question:   Specimen source     Answer:   Vagina [280]    AFP, MATERNAL SCREEN     Order Specific Question:   Patient Race? Answer: Other     Order Specific Question:   Is this patient insulin dependent? Answer:   No     Order Specific Question:   Indicate gestational age calculation method     Answer:   Ultrasound     Order Specific Question:   Pregnancy Type? Answer:   Single     Order Specific Question:   Gestational Age (weeks)? Answer:   25     Order Specific Question:   Patient Weight     Answer:   157     Order Specific Question:   [facility] Maternal Weight     Answer:   [address] LAB TEST     Order Specific Question:   Test description:     Answer:   panorama    MISC. LAB TEST     Order Specific Question:   Test description:     Answer:   horizon    TYPE & SCREEN     ENTER SURGERY DATE IF FOR PRE-OP TESTING. Standing Status:   Future     Number of Occurrences:   1     Standing Expiration Date:   3/9/2023     Order Specific Question:   Has patient been transfused or pregnant in the last 3 mos. ? Answer:   Unknown    doxylamine-pyridoxine, vit B6, (Diclegis) 10-10 mg TbEC DR tablet     Si tabs at bedtime, then 1 tab in AM if needed, then 1 tab in afternoon if needed.  Max 4 tabs/d     Dispense: 120 Tablet     Refill:  2    promethazine (PHENERGAN) 25 mg tablet     Sig: Take 1 Tablet by mouth every six (6) hours as needed for Nausea. Dispense:  30 Tablet     Refill:  1    ondansetron hcl (Zofran) 4 mg tablet     Sig: Take 1 Tablet by mouth every eight (8) hours as needed for Nausea. Dispense:  20 Tablet     Refill:  0    PAP IG, APTIMA HPV AND RFX 97/16,63 ([id number])     Order Specific Question:   Pap Source? Answer:   Cervical and Endocervical     Order Specific Question:   Total Hysterectomy? Answer:   No     Order Specific Question:   Supracervical Hysterectomy? Answer:   No     Order Specific Question:   Post Menopausal?     Answer:   No     Order Specific Question:   Hormone Therapy? Answer:   No     Order Specific Question:   IUD? Answer:   No     Order Specific Question:   Abnormal Bleeding? Answer:   No     Order Specific Question:   Pregnant     Answer:   No     Order Specific Question:   Post Partum? Answer:    No

## 2022-03-11 LAB
ABO + RH BLD: NORMAL
BLOOD BANK CMNT PATIENT-IMP: NORMAL
BLOOD GROUP ANTIBODIES SERPL: NORMAL
ERYTHROCYTE [DISTWIDTH] IN BLOOD BY AUTOMATED COUNT: 13.1 % (ref 11.5–14.5)
HBV SURFACE AG SER QL: 0.68 INDEX
HBV SURFACE AG SER QL: NEGATIVE
HCT VFR BLD AUTO: 32.6 % (ref 35–47)
HGB BLD-MCNC: 10.8 G/DL (ref 11.5–16)
HIV 1+2 AB+HIV1 P24 AG SERPL QL IA: NONREACTIVE
HIV12 RESULT COMMENT, HHIVC: NORMAL
MCH RBC QN AUTO: 31.3 PG (ref 26–34)
MCHC RBC AUTO-ENTMCNC: 33.1 G/DL (ref 30–36.5)
MCV RBC AUTO: 94.5 FL (ref 80–99)
NRBC # BLD: 0 K/UL (ref 0–0.01)
NRBC BLD-RTO: 0 PER 100 WBC
PLATELET # BLD AUTO: 267 K/UL (ref 150–400)
PMV BLD AUTO: 10.6 FL (ref 8.9–12.9)
RBC # BLD AUTO: 3.45 M/UL (ref 3.8–5.2)
RPR SER QL: NONREACTIVE
RUBV IGG SER-IMP: REACTIVE
RUBV IGG SERPL IA-ACNC: 101.6 IU/ML
SPECIMEN EXP DATE BLD: NORMAL
WBC # BLD AUTO: 9.5 K/UL (ref 3.6–11)

## 2022-03-12 LAB
AFP INTERP SERPL-IMP: NORMAL
AFP INTERP SERPL-IMP: NORMAL
AFP MOM SERPL: 1.09
AFP SERPL-MCNC: 78.6 NG/ML
AGE AT DELIVERY: 33.4 YR
COMMENT, 018013: NORMAL
GA METHOD: NORMAL
GA: 22 WEEKS
HCV AB S/CO SERPL IA: <0.1 S/CO RATIO (ref 0–0.9)
HCV AB SERPL QL IA: NORMAL
IDDM PATIENT QL: NO
MULTIPLE PREGNANCY: NO
NEURAL TUBE DEFECT RISK FETUS: 9231 %
RESULTS, 017004: NORMAL

## 2022-03-14 LAB
C TRACH DNA SPEC QL NAA+PROBE: NEGATIVE
N GONORRHOEA DNA SPEC QL NAA+PROBE: NEGATIVE
SPECIMEN STATUS REPORT, ROLRST: NORMAL
T VAGINALIS DNA SPEC QL NAA+PROBE: NEGATIVE

## 2022-03-16 LAB
CYTOLOGIST CVX/VAG CYTO: NORMAL
CYTOLOGY CVX/VAG DOC CYTO: NORMAL
CYTOLOGY CVX/VAG DOC THIN PREP: NORMAL
CYTOLOGY HISTORY:: NORMAL
DX ICD CODE: NORMAL
HPV I/H RISK 4 DNA CVX QL PROBE+SIG AMP: NEGATIVE
Lab: NORMAL
OTHER STN SPEC: NORMAL
STAT OF ADQ CVX/VAG CYTO-IMP: NORMAL

## 2022-03-19 PROBLEM — Z34.90 PREGNANCY: Status: ACTIVE | Noted: 2022-03-10

## 2022-04-06 NOTE — PROGRESS NOTES
+fm. 1hr/cbc/iron/ferritin today. Adv iron 1-2x/d. UCx today. RHETT 2wks        - U=D. LMP for dating: 10/5/21 -> ALISSA=7/5/22   - late [facility] @ 22wks  - Panorama, Horizon, AFP with NOB labs today. - some N/V/GERD -> eRX phenergan, zofran, diclegis; rec trial of OTC reflux meds  - RHETT 4wks with 1hr  - Anemia: hgb=10.8 > iron BID; draw ferritin, iron profile next visit 4/7/22**____   - [place] (XX) low risk   - AFP low risk   - need urine culture, not collected at initial appt.   - Horizon 27 neg

## 2022-04-07 ENCOUNTER — ROUTINE PRENATAL (OUTPATIENT)
Dept: OBGYN CLINIC | Age: 32
End: 2022-04-07
Payer: MEDICAID

## 2022-04-07 VITALS
HEART RATE: 106 BPM | SYSTOLIC BLOOD PRESSURE: 108 MMHG | DIASTOLIC BLOOD PRESSURE: 67 MMHG | BODY MASS INDEX: 30.66 KG/M2 | HEIGHT: 61 IN | RESPIRATION RATE: 20 BRPM | WEIGHT: 162.4 LBS

## 2022-04-07 DIAGNOSIS — Z34.80 SUPERVISION OF OTHER NORMAL PREGNANCY, ANTEPARTUM: Primary | ICD-10-CM

## 2022-04-07 DIAGNOSIS — N92.6 MISSED MENSES: ICD-10-CM

## 2022-04-07 PROCEDURE — 0502F SUBSEQUENT PRENATAL CARE: CPT | Performed by: OBSTETRICS & GYNECOLOGY

## 2022-04-08 LAB
ERYTHROCYTE [DISTWIDTH] IN BLOOD BY AUTOMATED COUNT: 12.9 % (ref 11.5–14.5)
FERRITIN SERPL-MCNC: 17 NG/ML (ref 26–388)
GLUCOSE 1H P 100 G GLC PO SERPL-MCNC: 119 MG/DL (ref 65–140)
HCT VFR BLD AUTO: 32.5 % (ref 35–47)
HGB BLD-MCNC: 10.6 G/DL (ref 11.5–16)
IRON SATN MFR SERPL: 14 % (ref 20–50)
IRON SERPL-MCNC: 63 UG/DL (ref 35–150)
MCH RBC QN AUTO: 30.6 PG (ref 26–34)
MCHC RBC AUTO-ENTMCNC: 32.6 G/DL (ref 30–36.5)
MCV RBC AUTO: 93.9 FL (ref 80–99)
NRBC # BLD: 0 K/UL (ref 0–0.01)
NRBC BLD-RTO: 0 PER 100 WBC
PLATELET # BLD AUTO: 292 K/UL (ref 150–400)
PMV BLD AUTO: 10.4 FL (ref 8.9–12.9)
RBC # BLD AUTO: 3.46 M/UL (ref 3.8–5.2)
TIBC SERPL-MCNC: 453 UG/DL (ref 250–450)
WBC # BLD AUTO: 8.6 K/UL (ref 3.6–11)

## 2022-04-09 LAB
BACTERIA SPEC CULT: NORMAL
SERVICE CMNT-IMP: NORMAL

## 2022-04-19 NOTE — PROGRESS NOTES
+FM. TDAP today. Taking iron BID. RHETT 2wks. - U=D. LMP for dating: 10/5/21 -> ALISSA=7/5/22  - late [facility] @ 22wks  - Anemia: hgb=10.8 > iron BID; (4/7) hgb=10.6, ferritin=17, Fe Sat=14%  - Panorama (XX) low risk. Horizon 27 neg. AFP low risk   - US (3/10/22) 22+2. Post placenta. Nl morph.

## 2022-04-21 ENCOUNTER — ROUTINE PRENATAL (OUTPATIENT)
Dept: OBGYN CLINIC | Age: 32
End: 2022-04-21
Payer: MEDICAID

## 2022-04-21 VITALS
HEART RATE: 110 BPM | SYSTOLIC BLOOD PRESSURE: 120 MMHG | BODY MASS INDEX: 31.18 KG/M2 | WEIGHT: 165 LBS | DIASTOLIC BLOOD PRESSURE: 68 MMHG

## 2022-04-21 DIAGNOSIS — Z34.80 SUPERVISION OF OTHER NORMAL PREGNANCY, ANTEPARTUM: Primary | ICD-10-CM

## 2022-04-21 DIAGNOSIS — Z23 ENCOUNTER FOR IMMUNIZATION: ICD-10-CM

## 2022-04-21 PROCEDURE — 0502F SUBSEQUENT PRENATAL CARE: CPT | Performed by: OBSTETRICS & GYNECOLOGY

## 2022-04-21 PROCEDURE — 90715 TDAP VACCINE 7 YRS/> IM: CPT

## 2022-04-21 PROCEDURE — 90471 IMMUNIZATION ADMIN: CPT | Performed by: OBSTETRICS & GYNECOLOGY

## 2022-04-21 NOTE — PROGRESS NOTES
After obtaining consent, and per orders of mrava, injection of tdap 0.5ml given in right arm by Maritza Irving RN. Patient instructed to remain in clinic for 20 minutes afterwards, and to report any adverse reaction to me immediately. Lot: [id number] Exp: 3/12/24 NDC: 54565-106-05 , VIS given.

## 2022-04-21 NOTE — PATIENT INSTRUCTIONS
[place] Tdap (tétanos, difteria, tos [place]): Lo que necesita saber  ¿Por qué es necesario vacunarse? La vacuna contra Tdap puede prevenir el tétanos, la difteria y la tos ferina (tetanus, diphteria and pertussis, Tdap). Jearline Massa y la tos [place] se [place] de persona a persona. El tétanos entra en el cuerpo a través de portillo o heridas. · El TÉTANOS (T) causa rigidez dolorosa de los músculos. El tétanos puede causar problemas graves de [place], jorje incapacidad para abrir la boca, dificultad para tragar y respirar o la muerte. · La DIFTERIA (D) puede causar dificultad para respirar, insuficiencia cardíaca, parálisis o muerte. · La TOS FERINA (aP), también conocida jorje \"coqueluche\", puede causar tos violenta e incontrolable que dificulta respirar, comer o beber. La tos ferina puede ser extremadamente grave, especialmente en bebés y niños pequeños, y causar neumonía, convulsiones, daño cerebral o muerte. En adolescentes y [address], puede causar pérdida de [place], pérdida de control de la vejiga, [place] y fracturas de costillas por la tos intensa. [place] Tdap  La vacuna Tdap es solo para niños de 7 años o [place], adolescentes y [address]. Los adolescentes deben recibir eun obed dosis de la vacuna Tdap, de preferencia a los 11 o 12 años. Las embarazadas deben recibir eun dosis de Tdap chang cada embarazo, de preferencia en la primera parte del tercer trimestre, jorje ayuda para proteger al recién nacido contra la tos [place]. Los lactantes están en mayor riesgo de tener complicaciones graves y potencialmente mortales de la tos [place]. Los adultos que no kothari recibido la vacuna Tdap deben recibir eun dosis de la vacuna Tdap. Además, los adultos deben recibir eun dosis de refuerzo de las vacunas Tdap o Td (otra [place], que protege contra el tétanos y difteria, no contra la tos [place]) cada 10 años, o después de 5 años en el hola de eun herida o [place] grave o contaminada.   Las vacunas Tdap o Td se pueden aplicar al mismo tiempo que otras vacunas. Hable con r[place]dale proveedor de [facility]  Informe a r[place]dale proveedor de vacunas si la persona que recibe la vacuna:  · Ha tenido eun reacción alérgica después de recibir eun dosis previa de cualquier vacuna que proteja contra el tétanos, difteria o tos ferina, o si ha tenido cualquier alergia severa y potencialmente mortal  · Florentino Jose tenido coma, disminución del nivel de consciencia o convulsiones prolongadas en los 7 días posteriores a eun dosis previa de cualquier vacuna contra la tos ferina (DTP, DTaP o Tdap)  · Tiene convulsiones u otro problema del sistema nervioso  · Vlad Tate su tenido el Guillain-Barré Syndrome (también llamado \"GBS\")  · Ha tenido dolor intenso o hinchazón después de eun dosis previa de cualquier vacuna que proteja contra el tétanos o la difteria  En algunos [place], ragsdale proveedor de atención médica podría decidir que se posponga la vacuna Tdap hasta eun visita futura. Se puede vacunar a personas con enfermedades leves, jorje el catarro común. Las personas con enfermedad moderada o grave usualmente deben esperar hasta recuperarse antes de recibir la vacuna Tdap. [place] proveedor de atención médica puede proporcionarle más información. Riesgos de [facility] reacción a la vacuna  · A veces ocurren dolor, enrojecimiento o hinchazón en el sitio donde se administró la inyección, fiebre leve, dolor de andres, sensación de cansancio, náusea, vómito, diarrea o dolor estomacal después de recibir la vacuna Tdap. En algunos casos, las personas se [address] después de procedimientos médicos, incluidas las vacunaciones. Informe a ragsdale proveedor de atención médica si se siente mareado o si tiene [employer] visión o zumbido [facility]. Al igual que con cualquier [place], hay probabilidades muy remotas de que eun vacuna cause eun reacción alérgica grave, otra lesión grave o la muerte. ¿Qué hago si ocurre algún problema grave?   Podría ocurrir eun reacción alérgica después de que la persona vacunada deje la clínica. Si observa signos de eun reacción alérgica grave (ronchas, hinchazón de la farrah y garganta, dificultad para respirar, latidos rápidos, mareo o debilidad), llame al 9-1-1 y lleve a la persona al hospital más cercano. Llame a ragsdale proveedor de atención médica si hay otros signos que le preocupan. Las reacciones adversas se deben reportar al Vaccine Adverse Event Reporting System, VAERS (Sistema para reportar reacciones adversas a las vacunas). Es usual que el proveedor de atención médica informe sobre [place], o también puede hacerlo usted mismo. Visite el sitio web de VAERS en www.vaSimpirica Spine. hhs.gov o llame al 0-136.970.3019. El VAERS es solo para informar sobre reacciones y el personal de VAERS no proporciona consejos médicos. Programa nacional de compensación por lesiones ocasionadas por vacunas  El National Vaccine Injury [employer], VICP (Programa nacional de compensación por lesiones ocasionadas por vacunas) es un programa federal que se creó para compensar a las personas que podrían osbaldo experimentado lesiones ocasionadas por ciertas vacunas. Las reclamaciones relativas a presuntas lesiones o fallecimientos debidos a la vacunación tienen un límite de tiempo para ragsdale presentación, que puede ser de tan solo [employer]. Visite el sitio web de VICP en www.[facility].gov/vaccinecompensation o llame al 1-233.136.7504 para obtener información acerca del programa y de cómo presentar eun reclamación. ¿Dónde puedo obtener más información? · Consulte a ragsdale proveedor de [facility]. · Llame a ragsdale departamento de [place] local o estatal.  · Visite el sitio web de la Food and Drug Administration, FDA (Administración de Alimentos y Medicamentos), para consultar los folletos informativos de las vacunas e información adicional en www.fda.gov/vaccines-blood-biologics/vaccines.   · Comuníquese con los Centers for Disease Control and Prevention, CDC (Centros para el Control y Prevención de Enfermedades):  ? Llalvina al 4-352-720-674-466-3814 (9-189-QOU-INFO) o  ? Visite el sitio web de los CDC en www.cdc.gov/vaccines. Vaccine Information Statement  Tdap (Tetanus, Diphtheria, Pertussis) Vaccine  8/6/2021  [place]ian  [address] [id number]  Department of Health and Human Services  Centers for Disease Control and Prevention  [place]odian translation provided by the [employer]  Many vaccine information statements are available in [place]ian and other languages. See www.immunize.org/vis  Están disponibles hojas de información sobre vacunas en español y en muchos otros idiomas. Visite www.immunize.org/vis  Las instrucciones de cuidado fueron adaptadas bajo licencia por [employer] (which disclaims liability or warranty for this information). Si usted tiene [place] afección médica o sobre estas instrucciones, siempre pregunte a ragsdale profesional de [place]. [employer] niega toda garantía o responsabilidad por ragsdale uso de esta información.

## 2022-05-03 NOTE — PROGRESS NOTES
+FM. Some RLQ/groin pain, MSK. Taking iron BID. RHETT 2wks, plan US @ 34wks.      - U=D. LMP for dating: 10/5/21 -> ALISSA=7/5/22  - late [facility] @ 22wks  - Anemia: hgb=10.8 > iron BID; (4/7) hgb=10.6, ferritin=17, Fe Sat=14%  - Panorama (XX) low risk. Horizon 27 neg. AFP low risk   - US (3/10/22) 22+2. Post placenta. Nl morph.

## 2022-05-05 ENCOUNTER — ROUTINE PRENATAL (OUTPATIENT)
Dept: OBGYN CLINIC | Age: 32
End: 2022-05-05
Payer: MEDICAID

## 2022-05-05 VITALS
DIASTOLIC BLOOD PRESSURE: 71 MMHG | HEART RATE: 110 BPM | BODY MASS INDEX: 31.55 KG/M2 | WEIGHT: 167 LBS | SYSTOLIC BLOOD PRESSURE: 102 MMHG

## 2022-05-05 DIAGNOSIS — Z34.80 SUPERVISION OF OTHER NORMAL PREGNANCY, ANTEPARTUM: Primary | ICD-10-CM

## 2022-05-05 PROCEDURE — 0502F SUBSEQUENT PRENATAL CARE: CPT | Performed by: OBSTETRICS & GYNECOLOGY

## 2022-05-18 NOTE — PROGRESS NOTES
+FM. Mat belt helping. Disc wt gain. RHETT 2wks, has US 6/10      - U=D. LMP for dating: 10/5/21 -> ALISSA=7/5/22  - late [facility] @ 22wks  - Anemia: hgb=10.8 > iron BID; (4/7) hgb=10.6, ferritin=17, Fe Sat=14%  - Panorama (XX) low risk. Horizon 27 neg. AFP low risk   - US (3/10/22) 22+2. Post placenta. Nl morph.

## 2022-05-19 ENCOUNTER — ROUTINE PRENATAL (OUTPATIENT)
Dept: OBGYN CLINIC | Age: 32
End: 2022-05-19
Payer: MEDICAID

## 2022-05-19 VITALS
DIASTOLIC BLOOD PRESSURE: 61 MMHG | HEART RATE: 100 BPM | BODY MASS INDEX: 32.5 KG/M2 | SYSTOLIC BLOOD PRESSURE: 102 MMHG | WEIGHT: 172 LBS

## 2022-05-19 DIAGNOSIS — Z34.80 SUPERVISION OF OTHER NORMAL PREGNANCY, ANTEPARTUM: Primary | ICD-10-CM

## 2022-05-19 PROCEDURE — 0502F SUBSEQUENT PRENATAL CARE: CPT | Performed by: OBSTETRICS & GYNECOLOGY

## 2022-06-02 ENCOUNTER — ROUTINE PRENATAL (OUTPATIENT)
Dept: OBGYN CLINIC | Age: 32
End: 2022-06-02
Payer: MEDICAID

## 2022-06-02 VITALS
DIASTOLIC BLOOD PRESSURE: 62 MMHG | SYSTOLIC BLOOD PRESSURE: 101 MMHG | BODY MASS INDEX: 32.69 KG/M2 | WEIGHT: 173 LBS | HEART RATE: 113 BPM

## 2022-06-02 DIAGNOSIS — Z34.80 SUPERVISION OF OTHER NORMAL PREGNANCY, ANTEPARTUM: Primary | ICD-10-CM

## 2022-06-02 PROCEDURE — 0502F SUBSEQUENT PRENATAL CARE: CPT | Performed by: OBSTETRICS & GYNECOLOGY

## 2022-06-10 ENCOUNTER — ROUTINE PRENATAL (OUTPATIENT)
Dept: OBGYN CLINIC | Age: 32
End: 2022-06-10

## 2022-06-10 VITALS — DIASTOLIC BLOOD PRESSURE: 67 MMHG | SYSTOLIC BLOOD PRESSURE: 110 MMHG | BODY MASS INDEX: 32.95 KG/M2 | WEIGHT: 174.4 LBS

## 2022-06-10 DIAGNOSIS — Z34.80 SUPERVISION OF OTHER NORMAL PREGNANCY, ANTEPARTUM: Primary | ICD-10-CM

## 2022-06-10 PROCEDURE — 0502F SUBSEQUENT PRENATAL CARE: CPT | Performed by: OBSTETRICS & GYNECOLOGY

## 2022-06-10 NOTE — PROGRESS NOTES
+FM.  US today AGA: 2882gm (69%). BRADY=12.67, vtx. RHETT 1wk with GBS.      - U=D. LMP for dating: 10/5/21 -> ALISSA=7/5/22  - late [facility] @ 22wks  - Anemia: hgb=10.8 > iron BID; (4/7) hgb=10.6, ferritin=17, Fe Sat=14%  - Panorama (XX) low risk. Horizon 27 neg. AFP low risk   - US (3/10/22) 22+2. Post placenta. Nl morph. LIMITED OB SCAN  A SINGLE VERTEX 35W3D IUP IS SEEN. FETAL CARDIAC MOTION OBSERVED. LIMITED ANATOMY WAS VISUALIZED AND APPEARS WNL. APPROPRIATE FETAL GROWTH IS SEEN. SIZE=DATES. BRADY AND PLACENTA APPEAR WITHIN NORMAL LIMITS.

## 2022-06-16 NOTE — PROGRESS NOTES
+FM. +RLQ/back pain, pelvic pressure, some swelling in hands and feet. GBS today. 1/30/-4/ceph/post/med. RHETT 1wk      - U=D. LMP for dating: 10/5/21 -> ALISSA=7/5/22  - late [facility] @ 22wks  - Anemia: hgb=10.8 > iron BID; (4/7) hgb=10.6, ferritin=17, Fe Sat=14%  - Panorama (XX) low risk. Horizon 27 neg. AFP low risk   - US (3/10/22) 22+2. Post placenta. Nl morph. - US (6/10/22) 36+4 @ 35+3. 2882gn (69%). BRADY=12.67. Vtx.

## 2022-06-17 ENCOUNTER — ROUTINE PRENATAL (OUTPATIENT)
Dept: OBGYN CLINIC | Age: 32
End: 2022-06-17
Payer: MEDICAID

## 2022-06-17 VITALS
WEIGHT: 176 LBS | SYSTOLIC BLOOD PRESSURE: 106 MMHG | HEART RATE: 106 BPM | DIASTOLIC BLOOD PRESSURE: 68 MMHG | BODY MASS INDEX: 33.25 KG/M2

## 2022-06-17 DIAGNOSIS — Z34.80 SUPERVISION OF OTHER NORMAL PREGNANCY, ANTEPARTUM: Primary | ICD-10-CM

## 2022-06-17 PROCEDURE — 0502F SUBSEQUENT PRENATAL CARE: CPT | Performed by: OBSTETRICS & GYNECOLOGY

## 2022-06-22 LAB
GP B STREP DNA SPEC QL NAA+PROBE: NEGATIVE
SPECIMEN SOURCE: NORMAL

## 2022-06-23 NOTE — PATIENT INSTRUCTIONS
Week 40 of Your Pregnancy: Care Instructions  Overview     You are near the end of your pregnancy--and you're probably pretty uncomfortable. It may be harder to walk around. Lying down probably isn't comfortable either. You may have trouble getting to sleep or staying asleep. Most babies are born between 40 and 41 weeks. This is a good time to think about packing a bag for the hospital with items you'll need. Then you'll be ready when labor starts. Follow-up care is a key part of your treatment and safety. Be sure to make and go to all appointments, and call your doctor if you are having problems. It's also a good idea to know your test results and keep a list of the medicines you take. How can you care for yourself at home? Learn about breastfeeding  · Breastfeeding is best for your baby and good for you. · Breast milk has antibodies to help your baby fight infections. · If you breastfeed, you may lose weight faster. That's because making milk burns calories. · Learning the best ways to hold your baby will make breastfeeding easier. · Sometimes breastfeeding can make partners feel left out. If you have a partner, plan how you can care for your baby together. For example, your partner can bathe and diaper the baby. You can snuggle together when you breastfeed. · You may want to learn how to use a breast pump and store your milk. · If you choose to bottle feed, make the feeding feel like breastfeeding so you can bond with your baby. Always hold your baby and the bottle. Don't prop bottles or let your baby fall asleep with a bottle. Learn about crying  · It's common for babies to cry for 1 to 3 hours a day. Some cry more, and some cry less. · Babies don't cry to make you upset or because you're a bad parent. · Crying is how your baby communicates. Your baby may be hungry; have gas; need a diaper change; or feel cold, warm, tired, lonely, or tense. Sometimes babies cry for unknown reasons.   · If you respond to your baby's needs, your baby will learn to trust you. · Try to stay calm when your baby cries. Your baby may get more upset if they sense that you are upset. Know how to care for your   · Your baby's umbilical cord stump will drop off on its own, usually between 1 and 2 weeks. To care for your baby's umbilical cord area:  ? Clean the area at the bottom of the cord 2 or 3 times a day. ? Pay special attention to the area where the cord attaches to the skin. ? Keep the diaper folded below the cord. ? Use a damp washcloth or cotton ball to sponge bathe your baby until the stump has come off. · Your baby's first dark stool is called meconium. After the meconium is passed, your baby will develop their own bowel pattern. ? Some babies, especially  babies, have several bowel movements a day. Others have one or two a day, or one every 2 to 3 days. ?  babies often have loose, yellow stools. Formula-fed babies have more formed stools. ? If your baby's stools look like little pellets, your baby is constipated. After 2 days of constipation, call your baby's doctor. · If your baby will be circumcised, you can care for your baby at home. ? Gently rinse your baby's penis with warm water after every diaper change. Don't try to remove the film that forms on the penis. This film will go away on its own. Pat dry. ? Put petroleum ointment, such as Vaseline, on the area of the diaper that will touch your baby's penis. This will keep the diaper from sticking to your baby. ? Ask the doctor about giving your baby acetaminophen (Tylenol) for pain. Where can you learn more? Go to http://www.gray.com/  Enter N257 in the search box to learn more about \"Week 37 of Your Pregnancy: Care Instructions. \"  Current as of: 2021               Content Version: 13.2  © 4869-0395 [employer].    Care instructions adapted under license by [employer] (which disclaims liability or warranty for this information). If you have questions about a medical condition or this instruction, always ask your healthcare professional. [address] any warranty or liability for your use of this information.

## 2022-06-24 ENCOUNTER — ROUTINE PRENATAL (OUTPATIENT)
Dept: OBGYN CLINIC | Age: 32
End: 2022-06-24
Payer: MEDICAID

## 2022-06-24 VITALS — BODY MASS INDEX: 33.63 KG/M2 | DIASTOLIC BLOOD PRESSURE: 83 MMHG | WEIGHT: 178 LBS | SYSTOLIC BLOOD PRESSURE: 124 MMHG

## 2022-06-24 DIAGNOSIS — Z34.80 SUPERVISION OF OTHER NORMAL PREGNANCY, ANTEPARTUM: Primary | ICD-10-CM

## 2022-06-24 PROCEDURE — 0502F SUBSEQUENT PRENATAL CARE: CPT | Performed by: OBSTETRICS & GYNECOLOGY

## 2022-06-24 RX ORDER — NITROFURANTOIN 25; 75 MG/1; MG/1
CAPSULE ORAL
Qty: 30 CAPSULE | Refills: 3 | Status: SHIPPED | OUTPATIENT
Start: 2022-06-24 | End: 2022-06-24 | Stop reason: CLARIF

## 2022-06-24 RX ORDER — LANOLIN ALCOHOL/MO/W.PET/CERES
CREAM (GRAM) TOPICAL
COMMUNITY
End: 2022-07-16

## 2022-06-24 NOTE — PROGRESS NOTES
+FM. Back pain a little better with exercises. +pelvic pressure. Labor/ROM prec. RHETT 1wk.    - U=D. LMP for dating: 10/5/21 -> ALISSA=7/5/22  - late [facility] @ 22wks  - Anemia: hgb=10.8 > iron BID; (4/7) hgb=10.6, ferritin=17, Fe Sat=14%  - Panorama (XX) low risk. Horizon 27 neg. AFP low risk   - US (3/10/22) 22+2. Post placenta. Nl morph. - US (6/10/22) 36+4 @ 35+3. 2882gn (69%). BRADY=12.67. Vtx.

## 2022-07-05 ENCOUNTER — ROUTINE PRENATAL (OUTPATIENT)
Dept: OBGYN CLINIC | Age: 32
End: 2022-07-05
Payer: MEDICAID

## 2022-07-05 VITALS
DIASTOLIC BLOOD PRESSURE: 69 MMHG | BODY MASS INDEX: 33.82 KG/M2 | HEART RATE: 103 BPM | SYSTOLIC BLOOD PRESSURE: 111 MMHG | WEIGHT: 179 LBS

## 2022-07-05 DIAGNOSIS — Z34.80 SUPERVISION OF OTHER NORMAL PREGNANCY, ANTEPARTUM: Primary | ICD-10-CM

## 2022-07-05 PROCEDURE — 0502F SUBSEQUENT PRENATAL CARE: CPT | Performed by: OBSTETRICS & GYNECOLOGY

## 2022-07-05 NOTE — PATIENT INSTRUCTIONS
Labor Induction: Care Instructions  Overview  If you pass your due date and your labor does not start on its own, your doctor may want to try to start (induce) labor. Your doctor may suggest doing this for other reasons. It may be a good idea to induce labor if you have another problem. For example, it may be done if you have high blood pressure. Or it may be a good idea if the placenta can no longer give enough support to the baby. There are several ways to induce labor, such as using medicine or breaking the amniotic sac. After you have your baby, you should not have any side effects from the medicine used to start labor. Follow-up care is a key part of your treatment and safety. Be sure to make and go to all appointments, and call your doctor if you are having problems. It's also a good idea to know your test results and keep a list of the medicines you take. When should your labor be induced? Labor induction may be done if labor doesn't start on its own. Labor may be induced when:  · Your pregnancy has gone 1 to 2 weeks past your expected due date. · You have a problem that may harm your health or the health of your baby if you continue to be pregnant. This includes high blood pressure, preeclampsia, and diabetes. · Your water breaks, but labor does not start. When should you call for help? Watch closely for changes in your health, and be sure to contact your doctor if you have questions about inducing labor. Where can you learn more? Go to http://www.gray.com/  Enter [id number] in the search box to learn more about \"Labor Induction: Care Instructions. \"  Current as of: June 16, 2021               Content Version: 13.2  © 0679-8831 "[employer] Care instructions adapted under license by [employer] (which disclaims liability or warranty for this information).  If you have questions about a medical condition or this instruction, always ask your healthcare professional. [address] any warranty or liability for your use of this information.

## 2022-07-05 NOTE — PROGRESS NOTES
+FM, feeling more pressure, more ctx, but not regular. Declines cvx check. Tent IOL 7/19 with FB 7/18. RHETT 1wk.      - U=D. LMP for dating: 10/5/21 -> ALISSA=7/5/22  - late [facility] @ 22wks  - Anemia: hgb=10.8 > iron BID; (4/7) hgb=10.6, ferritin=17, Fe Sat=14%  - Panorama (XX) low risk. Horizon 27 neg. AFP low risk   - US (3/10/22) 22+2. Post placenta. Nl morph. - US (6/10/22) 36+4 @ 35+3. 2882gn (69%). BRADY=12.67. Vtx.

## 2022-07-11 ENCOUNTER — ROUTINE PRENATAL (OUTPATIENT)
Dept: OBGYN CLINIC | Age: 32
End: 2022-07-11
Payer: MEDICAID

## 2022-07-11 VITALS
DIASTOLIC BLOOD PRESSURE: 78 MMHG | BODY MASS INDEX: 33.63 KG/M2 | SYSTOLIC BLOOD PRESSURE: 117 MMHG | HEART RATE: 99 BPM | WEIGHT: 178 LBS

## 2022-07-11 DIAGNOSIS — Z34.80 SUPERVISION OF OTHER NORMAL PREGNANCY, ANTEPARTUM: Primary | ICD-10-CM

## 2022-07-11 PROCEDURE — 0502F SUBSEQUENT PRENATAL CARE: CPT | Performed by: OBSTETRICS & GYNECOLOGY

## 2022-07-13 ENCOUNTER — HOSPITAL ENCOUNTER (EMERGENCY)
Age: 32
Discharge: HOME | DRG: 560 | End: 2022-07-13
Admitting: OBSTETRICS & GYNECOLOGY
Payer: MEDICAID

## 2022-07-13 ENCOUNTER — HOSPITAL ENCOUNTER (INPATIENT)
Age: 32
LOS: 3 days | Discharge: HOME | DRG: 560 | End: 2022-07-16
Attending: OBSTETRICS & GYNECOLOGY | Admitting: OBSTETRICS & GYNECOLOGY
Payer: MEDICAID

## 2022-07-13 PROCEDURE — 59025 FETAL NON-STRESS TEST: CPT

## 2022-07-13 PROCEDURE — 99283 EMERGENCY DEPT VISIT LOW MDM: CPT

## 2022-07-13 PROCEDURE — 75810000275 HC EMERGENCY DEPT VISIT NO LEVEL OF CARE

## 2022-07-13 PROCEDURE — 65270000029 HC RM PRIVATE

## 2022-07-13 PROCEDURE — 75410000002 HC LABOR FEE PER 1 HR: Performed by: OBSTETRICS & GYNECOLOGY

## 2022-07-13 RX ORDER — NALOXONE HYDROCHLORIDE 0.4 MG/ML
0.4 INJECTION, SOLUTION INTRAMUSCULAR; INTRAVENOUS; SUBCUTANEOUS AS NEEDED
Status: DISCONTINUED | OUTPATIENT
Start: 2022-07-13 | End: 2022-07-14 | Stop reason: HOSPADM

## 2022-07-13 RX ORDER — SODIUM CHLORIDE 0.9 % (FLUSH) 0.9 %
5-40 SYRINGE (ML) INJECTION AS NEEDED
Status: DISCONTINUED | OUTPATIENT
Start: 2022-07-13 | End: 2022-07-15 | Stop reason: ALTCHOICE

## 2022-07-13 RX ORDER — OXYTOCIN/RINGER'S LACTATE 30/500 ML
87.3 PLASTIC BAG, INJECTION (ML) INTRAVENOUS AS NEEDED
Status: DISCONTINUED | OUTPATIENT
Start: 2022-07-13 | End: 2022-07-15 | Stop reason: ALTCHOICE

## 2022-07-13 RX ORDER — MAG HYDROX/ALUMINUM HYD/SIMETH 200-200-20
30 SUSPENSION, ORAL (FINAL DOSE FORM) ORAL
Status: DISCONTINUED | OUTPATIENT
Start: 2022-07-13 | End: 2022-07-14 | Stop reason: HOSPADM

## 2022-07-13 RX ORDER — OXYTOCIN/RINGER'S LACTATE 30/500 ML
10 PLASTIC BAG, INJECTION (ML) INTRAVENOUS AS NEEDED
Status: DISCONTINUED | OUTPATIENT
Start: 2022-07-13 | End: 2022-07-15 | Stop reason: ALTCHOICE

## 2022-07-13 RX ORDER — SODIUM CHLORIDE, SODIUM LACTATE, POTASSIUM CHLORIDE, CALCIUM CHLORIDE 600; 310; 30; 20 MG/100ML; MG/100ML; MG/100ML; MG/100ML
125 INJECTION, SOLUTION INTRAVENOUS CONTINUOUS
Status: DISCONTINUED | OUTPATIENT
Start: 2022-07-14 | End: 2022-07-14

## 2022-07-13 RX ORDER — ONDANSETRON 2 MG/ML
4 INJECTION INTRAMUSCULAR; INTRAVENOUS
Status: DISCONTINUED | OUTPATIENT
Start: 2022-07-13 | End: 2022-07-14

## 2022-07-13 NOTE — PROGRESS NOTES
1225: Written and verbal discharge instructions reviewed with patient. Pt verbalized understanding of discharge teaching and labor precautions. Pt and  deny any additional questions or concerns. 1228: Pt left unit ambulatory with  in no signs of distress.

## 2022-07-13 NOTE — DISCHARGE INSTRUCTIONS
Pregnancy Precautions: Care Instructions  Your Care Instructions     There is no sure way to prevent labor before your due date ( labor) or to prevent most other pregnancy problems. But there are things you can do to increase your chances of a healthy pregnancy. Go to your appointments, follow your doctor's advice, and take good care of yourself. Eat well, and exercise (if your doctor agrees). And make sure to drink plenty of water. Follow-up care is a key part of your treatment and safety. Be sure to make and go to all appointments, and call your doctor if you are having problems. It's also a good idea to know your test results and keep a list of the medicines you take. How can you care for yourself at home? · Make sure you go to your prenatal appointments. At each visit, your doctor will check your blood pressure. Your doctor will also check to see if you have protein in your urine. High blood pressure and protein in urine are signs of preeclampsia. This condition can be dangerous for you and your baby. · Drink plenty of fluids. Dehydration can cause contractions. If you have kidney, heart, or liver disease and have to limit fluids, talk with your doctor before you increase the amount of fluids you drink. · Tell your doctor right away if you notice any symptoms of an infection, such as:  ? Burning when you urinate. ? A foul-smelling discharge from your vagina. ? Vaginal itching. ? Unexplained fever. ? Unusual pain or soreness in your uterus or lower belly. · Eat a balanced diet. Include plenty of foods that are high in calcium and iron. ? Foods high in calcium include milk, cheese, yogurt, almonds, and broccoli. ? Foods high in iron include red meat, shellfish, poultry, eggs, beans, raisins, whole-grain bread, and leafy green vegetables. · Do not smoke. If you need help quitting, talk to your doctor about stop-smoking programs and medicines.  These can increase your chances of quitting for good.  · Do not drink alcohol or use marijuana or illegal drugs. · Follow your doctor's directions about activity. Your doctor will let you know how much, if any, exercise you can do. · Ask your doctor if you can have sex. If you are at risk for early labor, your doctor may ask you to not have sex. · Take care to prevent falls. During pregnancy, your joints are loose, and your balance is off. Sports such as bicycling, skiing, or in-line skating can increase your risk of falling. And don't ride horses or motorcycles, dive, water ski, scuba dive, or parachute jump while you are pregnant. · Avoid things that can make your body too hot and may be harmful to your baby, such as a hot tub or sauna. Or talk with your doctor before doing anything that raises your body temperature. Your doctor can tell you if it's safe. · Do not take any over-the-counter or herbal medicines or supplements without talking to your doctor or pharmacist first.  When should you call for help? Call 911  anytime you think you may need emergency care. For example, call if:    · You passed out (lost consciousness).     · You have a seizure.     · You have severe vaginal bleeding.     · You have severe pain in your belly or pelvis.     · You have had fluid gushing or leaking from your vagina and you know or think the umbilical cord is bulging into your vagina. If this happens, immediately get down on your knees so your rear end (buttocks) is higher than your head. This will decrease the pressure on the cord until help arrives. Call your doctor now or seek immediate medical care if:    · You have signs of preeclampsia, such as:  ? Sudden swelling of your face, hands, or feet. ? New vision problems (such as dimness, blurring, or seeing spots). ? A severe headache.     · You have any vaginal bleeding.     · You have belly pain or cramping.     · You have a fever.     · You have had regular contractions (with or without pain) for an hour.  This means that you have 8 or more within 1 hour or 4 or more in 20 minutes after you change your position and drink fluids.     · You have a sudden release of fluid from your vagina.     · You have low back pain or pelvic pressure that does not go away.     · You notice that your baby has stopped moving or is moving much less than normal.   Watch closely for changes in your health, and be sure to contact your doctor if you have any problems. Where can you learn more? Go to http://www.romero.com/  Enter Y951 in the search box to learn more about \"Pregnancy Precautions: Care Instructions. \"  Current as of: June 16, 2021               Content Version: 13.2  © 8622-6042 [employer]. Care instructions adapted under license by [employer] (which disclaims liability or warranty for this information). If you have questions about a medical condition or this instruction, always ask your healthcare professional. [place] [address] any warranty or liability for your use of this information. Counting Your Baby's Kicks: Care Instructions  Overview     Counting your baby's kicks is one way your doctor can tell that your baby is healthy. Most women--especially in a first pregnancy--feel their baby move for the first time between 16 and 22 weeks. The movement may feel like flutters rather than kicks. Your baby may move more at certain times of the day. When you are active, you may notice less kicking than when you are resting. At your prenatal visits, your doctor will ask whether the baby is active. In your last trimester, your doctor may ask you to count the number of times you feel your baby move. Follow-up care is a key part of your treatment and safety. Be sure to make and go to all appointments, and call your doctor if you are having problems. It's also a good idea to know your test results and keep a list of the medicines you take.   How do you count fetal kicks? · A common method of checking your baby's movement is to note the length of time it takes to count ten movements (such as kicks, flutters, or rolls). · Pick your baby's most active time of day to count. This may be any time from morning to evening. · If you don't feel 10 movements in an hour, have something to eat or drink and count for another hour. If you don't feel at least 10 movements in the 2-hour period, call your doctor. When should you call for help? Call your doctor now or seek immediate medical care if:    · You noticed that your baby has stopped moving or is moving much less than normal.   Watch closely for changes in your health, and be sure to contact your doctor if you have any problems. Where can you learn more? Go to http://www.gray.com/  Enter [id number] in the search box to learn more about \"Counting Your Baby's Kicks: Care Instructions. \"  Current as of: June 16, 2021               Content Version: 13.2  © 2006-2022 [employer]. Care instructions adapted under license by [employer] (which disclaims liability or warranty for this information). If you have questions about a medical condition or this instruction, always ask your healthcare professional. [address] any warranty or liability for your use of this information. Early Stage of Labor at Home: Care Instructions  Overview     If you came to the hospital while in early labor, your doctor may ask you to labor at home until your contractions are stronger. Many women stay at home during early labor. This is often the longest part of the birthing process. It may last up to 2 to 3 days. Contractions are mild to moderate and shorter (about 30 to 45 seconds). You can usually keep talking during them. Contractions may also be irregular, about 5 to 20 minutes apart. They may even stop for a while.   It helps to stay as relaxed as you can during this time. You can spend some or all of your early labor at home or anywhere else you may be comfortable. If you live far from the hospital or birthing center, you may want to think about going somewhere nearby so you can get back to the hospital quickly. For some women, there may be benefits to staying home during early labor, such as avoiding medicines or procedures. As labor progresses, you'll shift from early labor to active labor. During this time, contractions get more intense. They occur more often, about every 2 to 3 minutes. They also last longer, about 50 to 70 seconds. You will feel them even when you change positions and walk or move around. It may be hard to tell if you are in active labor. If you aren't sure, call your doctor or midwife. As your labor progresses, check in with your doctor or midwife about when to come back to the hospital or birthing center. You may have special instructions if your water broke or you tested positive for group B strep. Follow-up care is a key part of your treatment and safety. Be sure to make and go to all appointments, and call your doctor if you are having problems. It's also a good idea to know your test results and keep a list of the medicines you take. How can you care for yourself at home? · Get support. Having a support person with you from early labor until after childbirth can have a positive effect on childbirth. · Find distractions. During early labor, you can walk, play cards, watch TV, or listen to music to help take your mind off your contractions. · Ask your partner, labor , or  for a massage. Shoulder and low back massage during contractions may ease your pain. Strong massage of the back muscles (counterpressure) during contractions may help relieve the pain of back labor. Tell your labor  exactly where to push and how hard to push. · Use imagery. This means using your imagination to decrease your pain.  For instance, to help manage pain, picture your contractions as waves rolling over you. Picture a peaceful place, such as a beach or mountain stream, to help you relax between contractions. · Change positions during labor. Walking, kneeling, or sitting on a big rubber ball (birth ball) are good options. · Use focused breathing techniques. Breathing in a rhythm can distract you from pain. · Take a warm shower or bath. Warm water may ease pain and stress. When should you call for help? Call 911  anytime you think you may need emergency care. For example, call if:    · You passed out (lost consciousness).     · You have a seizure.     · You have severe vaginal bleeding.     · You have severe pain in your belly or pelvis that doesn't get better between contractions.     · You have had fluid gushing or leaking from your vagina and you know or think the umbilical cord is bulging into your vagina. If this happens, immediately get down on your knees so your rear end (buttocks) is higher than your head. This will decrease the pressure on the cord until help arrives. Call your doctor now or seek immediate medical care if:    · You have new or worse signs of preeclampsia, such as:  ? Sudden swelling of your face, hands, or feet. ? New vision problems (such as dimness, blurring, or seeing spots). ? A severe headache.     · You have any vaginal bleeding.     · You have belly pain or cramping.     · You have a fever.     · You have had regular contractions (with or without pain) for an hour. This means that you have 8 or more within 1 hour or 4 or more in 20 minutes after you change your position and drink fluids.     · You have a sudden release of fluid from your vagina.     · You have low back pain or pelvic pressure that does not go away.     · You notice that your baby has stopped moving or is moving much less than normal.   Watch closely for changes in your health, and be sure to contact your doctor if you have any problems.   Where can you learn more? Go to http://all-yolis.info/  Enter [id number] in the search box to learn more about \"Early Stage of Labor at Home: Care Instructions. \"  Current as of: June 16, 2021               Content Version: 13.2  © 4955-9295 [employer]. Care instructions adapted under license by [employer] (which disclaims liability or warranty for this information). If you have questions about a medical condition or this instruction, always ask your healthcare professional. [place] [address] any warranty or liability for your use of this information. Respiratory

## 2022-07-13 NOTE — H&P
History & Physical    Name: Adeel Diaz MRN: [id number]  SSN: [id number]    YOB: 1990  Age: 34 y.o. Sex: female        Subjective:     Estimated Date of Delivery: 22  OB History        3    Para   2    Term   2            AB        Living   2       SAB        IAB        Ectopic        Molar        Multiple   0    Live Births   2                Ms. Omar Beverly who presents with pregnancy at 40w1d for contractions. [[facility] pt, Dr. Dale Lesch requesting [facility] to eval pt]    Reports ctx since her office visit 2 days ago. Have varied in frequency and intensity. Has not been able to rest.  Today, q 6 min, lasting longer, more intense. +FM, no VB or LOF  Some discharge, mucous plug tinged with brownish discharge    Prenatal course was complicated by anemia. Otherwise uncomplicated. Please see prenatal records for details.       - U=D. LMP for dating: 10/5/21 -> ALISSA=22  - late [facility] @ 22wks  - Anemia: hgb=10.8 > iron BID; () hgb=10.6, ferritin=17, Fe Sat=14%  - Panorama (XX) low risk. Horizon 27 neg. AFP low risk   - US (3/10/22) 22+2. Post placenta. Nl morph. - US (6/10/22) 36+4 @ 35+3. 2882gn (69%). BRADY=12.67. Vtx.  -IOL (22).  CHRISTENSEN ()        Past Medical History:   Diagnosis Date    Screening for malignant neoplasm of the cervix 2018    Negative (no hpv)      Past Surgical History:   Procedure Laterality Date    HX CHOLECYSTECTOMY      HX WISDOM TEETH EXTRACTION       Social History     Occupational History    Not on file   Tobacco Use    Smoking status: Never Smoker    Smokeless tobacco: Never Used    Tobacco comment: No vapor or e-cigs    Substance and Sexual Activity    Alcohol use: No     Alcohol/week: 0.0 standard drinks    Drug use: No    Sexual activity: Not Currently     Partners: Male     Birth control/protection: None     Family History   Problem Relation Age of Onset    No Known Problems Other     No Known Problems Mother     No Known Problems Father        No Known Allergies  Prior to Admission medications    Medication Sig Start Date End Date Taking? Authorizing Provider   ferrous sulfate (Iron) 325 mg (65 mg iron) tablet Take  by mouth Daily (before breakfast). Provider, Historical   prenatal vit37/iron/folic acid (PRENATA PO) Take  by mouth. Provider, Historical        Review of Systems: A comprehensive review of systems was negative except for that written in the HPI. Objective:     Vitals:  Vitals:    22 1133   BP: (P) 119/69   Pulse: (!) (P) 103   Resp: (P) 16   Temp: (P) 98.2 °F (36.8 °C)   SpO2: (P) 98%   Weight: (P) 178 lb (80.7 kg)   Height: (P) 5' (1.524 m)        Physical Exam:  Patient without distress. Heart: Regular rate and rhythm or S1S2 present  Lung: clear to auscultation throughout lung fields, no wheezes, no rales, no rhonchi and normal respiratory effort  Fundus: soft and non tender  Cervical Exam: 3/50/-3 (per RN)  Membranes:  Intact  Fetal Heart Rate: Reactive  Baseline: 145 per minute  Variability: moderate  Accelerations: yes  Decelerations: none  Uterine contractions: regular, every 10-11 minutes  >20 minutes tracing reviewed      Prenatal Labs:   Lab Results   Component Value Date/Time    Rubella, External immune 2018 12:00 AM    GrBStrep, External Positive  2018 12:00 AM    HBsAg, External neg 2018 12:00 AM    HIV, External neg 2018 12:00 AM    Gonorrhea, External neg 2018 12:00 AM    Chlamydia, External neg 2018 12:00 AM        Assessment/Plan:     33yo  @ 40+1. Not in active labor. Given option to walk and be rechecked in 1-2hrs vs discharge home. They live ~20min from hospital.  Opts for discharge home  Suggest warm bath/shower and tylenol PM to help with rest  Labor/ROM precautions reviewed.         Signed By:  Lindsay Lee MD     2022

## 2022-07-13 NOTE — PROGRESS NOTES
1145 Patient arrived on the unit with complaints of contractions. 1147 Updated Dr Dallis Gosselin of patient's arrival [address] 3/50/-3.  RN received orders for [facility]

## 2022-07-14 ENCOUNTER — ANESTHESIA EVENT (OUTPATIENT)
Dept: LABOR AND DELIVERY | Age: 32
DRG: 560 | End: 2022-07-14
Payer: MEDICAID

## 2022-07-14 ENCOUNTER — ANESTHESIA (OUTPATIENT)
Dept: LABOR AND DELIVERY | Age: 32
DRG: 560 | End: 2022-07-14
Payer: MEDICAID

## 2022-07-14 PROBLEM — O09.30 LATE PRENATAL CARE: Status: ACTIVE | Noted: 2022-07-14

## 2022-07-14 PROBLEM — O99.013 ANEMIA AFFECTING PREGNANCY IN THIRD TRIMESTER: Status: ACTIVE | Noted: 2022-07-14

## 2022-07-14 LAB
ABO + RH BLD: NORMAL
BASOPHILS # BLD: 0 K/UL (ref 0–0.1)
BASOPHILS NFR BLD: 0 % (ref 0–1)
BLOOD GROUP ANTIBODIES SERPL: NORMAL
DIFFERENTIAL METHOD BLD: ABNORMAL
EOSINOPHIL # BLD: 0 K/UL (ref 0–0.4)
EOSINOPHIL NFR BLD: 0 % (ref 0–7)
ERYTHROCYTE [DISTWIDTH] IN BLOOD BY AUTOMATED COUNT: 14.3 % (ref 11.5–14.5)
HCT VFR BLD AUTO: 35.9 % (ref 35–47)
HGB BLD-MCNC: 12.2 G/DL (ref 11.5–16)
IMM GRANULOCYTES # BLD AUTO: 0.1 K/UL (ref 0–0.04)
IMM GRANULOCYTES NFR BLD AUTO: 1 % (ref 0–0.5)
LYMPHOCYTES # BLD: 1.1 K/UL (ref 0.8–3.5)
LYMPHOCYTES NFR BLD: 10 % (ref 12–49)
MCH RBC QN AUTO: 30 PG (ref 26–34)
MCHC RBC AUTO-ENTMCNC: 34 G/DL (ref 30–36.5)
MCV RBC AUTO: 88.2 FL (ref 80–99)
MONOCYTES # BLD: 0.6 K/UL (ref 0–1)
MONOCYTES NFR BLD: 5 % (ref 5–13)
NEUTS SEG # BLD: 9.9 K/UL (ref 1.8–8)
NEUTS SEG NFR BLD: 84 % (ref 32–75)
NRBC # BLD: 0 K/UL (ref 0–0.01)
NRBC BLD-RTO: 0 PER 100 WBC
PLATELET # BLD AUTO: 268 K/UL (ref 150–400)
PMV BLD AUTO: 10.6 FL (ref 8.9–12.9)
RBC # BLD AUTO: 4.07 M/UL (ref 3.8–5.2)
SPECIMEN EXP DATE BLD: NORMAL
WBC # BLD AUTO: 11.7 K/UL (ref 3.6–11)

## 2022-07-14 PROCEDURE — 74011000250 HC RX REV CODE- 250: Performed by: ANESTHESIOLOGY

## 2022-07-14 PROCEDURE — 74011000250 HC RX REV CODE- 250: Performed by: OBSTETRICS & GYNECOLOGY

## 2022-07-14 PROCEDURE — 75410000002 HC LABOR FEE PER 1 HR: Performed by: OBSTETRICS & GYNECOLOGY

## 2022-07-14 PROCEDURE — 85025 COMPLETE CBC W/AUTO DIFF WBC: CPT

## 2022-07-14 PROCEDURE — 10907ZC DRAINAGE OF AMNIOTIC FLUID, THERAPEUTIC FROM PRODUCTS OF CONCEPTION, VIA NATURAL OR ARTIFICIAL OPENING: ICD-10-PCS | Performed by: OBSTETRICS & GYNECOLOGY

## 2022-07-14 PROCEDURE — 86900 BLOOD TYPING SEROLOGIC ABO: CPT

## 2022-07-14 PROCEDURE — 74011250636 HC RX REV CODE- 250/636: Performed by: OBSTETRICS & GYNECOLOGY

## 2022-07-14 PROCEDURE — 77030019905 HC CATH URETH INTMIT MDII -A

## 2022-07-14 PROCEDURE — 77030014125 HC TY EPDRL BBMI -B: Performed by: ANESTHESIOLOGY

## 2022-07-14 PROCEDURE — 2709999900 HC NON-CHARGEABLE SUPPLY

## 2022-07-14 PROCEDURE — 36415 COLL VENOUS BLD VENIPUNCTURE: CPT

## 2022-07-14 PROCEDURE — 74011250637 HC RX REV CODE- 250/637: Performed by: OBSTETRICS & GYNECOLOGY

## 2022-07-14 PROCEDURE — 65270000029 HC RM PRIVATE

## 2022-07-14 PROCEDURE — 76060000078 HC EPIDURAL ANESTHESIA: Performed by: ANESTHESIOLOGY

## 2022-07-14 PROCEDURE — 74011250636 HC RX REV CODE- 250/636: Performed by: ANESTHESIOLOGY

## 2022-07-14 PROCEDURE — 75410000000 HC DELIVERY VAGINAL/SINGLE: Performed by: OBSTETRICS & GYNECOLOGY

## 2022-07-14 PROCEDURE — 75410000003 HC RECOV DEL/VAG/CSECN EA 0.5 HR: Performed by: OBSTETRICS & GYNECOLOGY

## 2022-07-14 RX ORDER — LIDOCAINE HYDROCHLORIDE AND EPINEPHRINE 15; 5 MG/ML; UG/ML
INJECTION, SOLUTION EPIDURAL AS NEEDED
Status: DISCONTINUED | OUTPATIENT
Start: 2022-07-14 | End: 2022-07-14 | Stop reason: HOSPADM

## 2022-07-14 RX ORDER — NALOXONE HYDROCHLORIDE 0.4 MG/ML
0.4 INJECTION, SOLUTION INTRAMUSCULAR; INTRAVENOUS; SUBCUTANEOUS AS NEEDED
Status: DISCONTINUED | OUTPATIENT
Start: 2022-07-14 | End: 2022-07-16 | Stop reason: HOSPADM

## 2022-07-14 RX ORDER — ONDANSETRON 2 MG/ML
4 INJECTION INTRAMUSCULAR; INTRAVENOUS
Status: DISCONTINUED | OUTPATIENT
Start: 2022-07-14 | End: 2022-07-15 | Stop reason: ALTCHOICE

## 2022-07-14 RX ORDER — OXYTOCIN/RINGER'S LACTATE 30/500 ML
10 PLASTIC BAG, INJECTION (ML) INTRAVENOUS AS NEEDED
Status: DISCONTINUED | OUTPATIENT
Start: 2022-07-14 | End: 2022-07-15 | Stop reason: ALTCHOICE

## 2022-07-14 RX ORDER — ACETAMINOPHEN 325 MG/1
650 TABLET ORAL
Status: DISCONTINUED | OUTPATIENT
Start: 2022-07-14 | End: 2022-07-16 | Stop reason: HOSPADM

## 2022-07-14 RX ORDER — DIPHENHYDRAMINE HYDROCHLORIDE 50 MG/ML
12.5 INJECTION, SOLUTION INTRAMUSCULAR; INTRAVENOUS
Status: DISCONTINUED | OUTPATIENT
Start: 2022-07-14 | End: 2022-07-16 | Stop reason: HOSPADM

## 2022-07-14 RX ORDER — BUPIVACAINE HYDROCHLORIDE 2.5 MG/ML
INJECTION, SOLUTION EPIDURAL; INFILTRATION; INTRACAUDAL AS NEEDED
Status: DISCONTINUED | OUTPATIENT
Start: 2022-07-14 | End: 2022-07-14 | Stop reason: HOSPADM

## 2022-07-14 RX ORDER — SIMETHICONE 80 MG
80 TABLET,CHEWABLE ORAL
Status: DISCONTINUED | OUTPATIENT
Start: 2022-07-14 | End: 2022-07-16 | Stop reason: HOSPADM

## 2022-07-14 RX ORDER — ADHESIVE BANDAGE
30 BANDAGE TOPICAL DAILY PRN
Status: DISCONTINUED | OUTPATIENT
Start: 2022-07-14 | End: 2022-07-16 | Stop reason: HOSPADM

## 2022-07-14 RX ORDER — HYDROCODONE BITARTRATE AND ACETAMINOPHEN 5; 325 MG/1; MG/1
1 TABLET ORAL
Status: DISCONTINUED | OUTPATIENT
Start: 2022-07-14 | End: 2022-07-16 | Stop reason: HOSPADM

## 2022-07-14 RX ORDER — OXYTOCIN/RINGER'S LACTATE 30/500 ML
87.3 PLASTIC BAG, INJECTION (ML) INTRAVENOUS AS NEEDED
Status: DISCONTINUED | OUTPATIENT
Start: 2022-07-14 | End: 2022-07-15 | Stop reason: ALTCHOICE

## 2022-07-14 RX ORDER — IBUPROFEN 800 MG/1
800 TABLET ORAL EVERY 8 HOURS
Status: DISCONTINUED | OUTPATIENT
Start: 2022-07-14 | End: 2022-07-16 | Stop reason: HOSPADM

## 2022-07-14 RX ORDER — FENTANYL/BUPIVACAINE/NS/PF 2-1250MCG
1-16 PREFILLED PUMP RESERVOIR EPIDURAL CONTINUOUS
Status: DISCONTINUED | OUTPATIENT
Start: 2022-07-14 | End: 2022-07-14 | Stop reason: HOSPADM

## 2022-07-14 RX ORDER — EPHEDRINE SULFATE/0.9% NACL/PF 50 MG/5 ML
10 SYRINGE (ML) INTRAVENOUS
Status: DISCONTINUED | OUTPATIENT
Start: 2022-07-14 | End: 2022-07-14 | Stop reason: HOSPADM

## 2022-07-14 RX ORDER — PEPPERMINT OIL
SPIRIT ORAL
Status: DISPENSED
Start: 2022-07-14 | End: 2022-07-15

## 2022-07-14 RX ORDER — HYDROCORTISONE ACETATE PRAMOXINE HCL 2.5; 1 G/100G; G/100G
CREAM TOPICAL AS NEEDED
Status: DISCONTINUED | OUTPATIENT
Start: 2022-07-14 | End: 2022-07-14

## 2022-07-14 RX ORDER — LIDOCAINE HYDROCHLORIDE 10 MG/ML
INJECTION INFILTRATION; PERINEURAL AS NEEDED
Status: DISCONTINUED | OUTPATIENT
Start: 2022-07-14 | End: 2022-07-14 | Stop reason: HOSPADM

## 2022-07-14 RX ORDER — SODIUM CHLORIDE, SODIUM LACTATE, POTASSIUM CHLORIDE, CALCIUM CHLORIDE 600; 310; 30; 20 MG/100ML; MG/100ML; MG/100ML; MG/100ML
125 INJECTION, SOLUTION INTRAVENOUS CONTINUOUS
Status: DISCONTINUED | OUTPATIENT
Start: 2022-07-14 | End: 2022-07-14 | Stop reason: HOSPADM

## 2022-07-14 RX ADMIN — IBUPROFEN 800 MG: 800 TABLET, FILM COATED ORAL at 20:39

## 2022-07-14 RX ADMIN — SODIUM CHLORIDE, POTASSIUM CHLORIDE, SODIUM LACTATE AND CALCIUM CHLORIDE 125 ML/HR: 600; 310; 30; 20 INJECTION, SOLUTION INTRAVENOUS at 01:11

## 2022-07-14 RX ADMIN — Medication 12 ML/HR: at 01:38

## 2022-07-14 RX ADMIN — HYDROCODONE BITARTRATE AND ACETAMINOPHEN 1 TABLET: 5; 325 TABLET ORAL at 15:52

## 2022-07-14 RX ADMIN — SODIUM CHLORIDE, POTASSIUM CHLORIDE, SODIUM LACTATE AND CALCIUM CHLORIDE 999 ML/HR: 600; 310; 30; 20 INJECTION, SOLUTION INTRAVENOUS at 00:01

## 2022-07-14 RX ADMIN — HYDROCODONE BITARTRATE AND ACETAMINOPHEN 1 TABLET: 5; 325 TABLET ORAL at 11:49

## 2022-07-14 RX ADMIN — IBUPROFEN 800 MG: 800 TABLET, FILM COATED ORAL at 11:48

## 2022-07-14 RX ADMIN — CEFAZOLIN 2 G: 1 INJECTION, POWDER, FOR SOLUTION INTRAMUSCULAR; INTRAVENOUS at 08:01

## 2022-07-14 RX ADMIN — LIDOCAINE HYDROCHLORIDE 3 ML: 10 INJECTION, SOLUTION INFILTRATION; PERINEURAL at 00:38

## 2022-07-14 RX ADMIN — LIDOCAINE HYDROCHLORIDE AND EPINEPHRINE 3 ML: 15; 5 INJECTION, SOLUTION EPIDURAL at 00:43

## 2022-07-14 RX ADMIN — HYDROCODONE BITARTRATE AND ACETAMINOPHEN 1 TABLET: 5; 325 TABLET ORAL at 23:14

## 2022-07-14 RX ADMIN — BUPIVACAINE HYDROCHLORIDE 7 ML: 2.5 INJECTION, SOLUTION EPIDURAL; INFILTRATION; INTRACAUDAL; PERINEURAL at 00:47

## 2022-07-14 NOTE — PROGRESS NOTES
2320 - Pt arrived to unit from ED with c/o contractions 4-5 mins apart. Pt denies leakage/loss of fluid, vaginal bleeding, epigastric pain, HA, or vision changes. Pt endorses positive fetal movement. Pt oriented to room, given call bell, and instructed to change into gown. 2333 - RN performing SVE (5/70/-2).     0001 - Pt would like epidural. RN starting LR IVF bolus at this time. 36 - Dr. Saman Layne at pt bedside placing epidural catheter. Time-out performed at this time. 200 - RN performing SVE (7/80/-2). 0115 - SBAR report given to SHAWN Michelle RN and Trinity Meza RN.

## 2022-07-14 NOTE — ANESTHESIA PROCEDURE NOTES
Epidural Block    Patient location during procedure: OB  Start time: 7/14/2022 12:37 AM  End time: 7/14/2022 12:47 AM  Reason for block: labor epidural  Staffing  Performed: attending   Anesthesiologist: Noelle Haas MD  Preanesthetic Checklist  Completed: patient identified, IV checked, site marked, risks and benefits discussed, surgical consent, monitors and equipment checked, pre-op evaluation, timeout performed and fire risk safety assessment completed and verbalized  Block Placement  Patient position: sitting  Prep: ChloraPrep  Sterility prep: cap, drape, gloves, hand and mask  Sedation level: no sedation  Patient monitoring: heart rate and continuous pulse oximetry  Approach: midline  Location: lumbar  Lumbar location: L3-L4  Epidural  Loss of resistance technique: saline  Guidance: landmark technique  Needle  Needle type: Tuohy   Needle gauge: 17 G  Needle length: 9 cm  Catheter type: multi-orifice  Catheter size: 20 G  Catheter securement method: clear occlusive dressing and liquid medical adhesive  Test dose: negative  Assessment  Block outcome: pain improved  Number of attempts: 1  Procedure assessment: patient tolerated procedure well with no immediate complications

## 2022-07-14 NOTE — ANESTHESIA PREPROCEDURE EVALUATION
Relevant Problems   HEMATOLOGY   (+) Anemia affecting pregnancy in third trimester       Anesthetic History   No history of anesthetic complications            Review of Systems / Medical History  Patient summary reviewed and pertinent labs reviewed    Pulmonary  Within defined limits                 Neuro/Psych   Within defined limits           Cardiovascular  Within defined limits                     GI/Hepatic/Renal  Within defined limits              Endo/Other        Obesity     Other Findings   Comments: Active labor           Physical Exam    Airway  Mallampati: III           Cardiovascular               Dental         Pulmonary                 Abdominal         Other Findings            Anesthetic Plan    ASA: 2  Anesthesia type: epidural          Induction: Intravenous  Anesthetic plan and risks discussed with: Patient

## 2022-07-14 NOTE — L&D DELIVERY NOTE
Delivery Summary    Patient: Elsie Gordon MRN: [id number]  SSN: [id number]    YOB: 1990  Age: 34 y.o. Sex: female       The fetus was initially OP and was rotated to CHANTELLE presentation resulting in  of a term viable female infant in CHANTELLE presentation with apgars 9/9 over and intact perineum. The infant was placed on the mother's abdomen upon delivery and clamping of the umbilical cord was delayed for 1 minute. The umbilical cord was then doubly clamped and was cut by the FOB. An attempt was made to manually express the placenta but the uterus was starting to clamp down and the placenta was noted to be densely adhered to the right side of the uterine wall. The placenta was therefore manually extracted in one piece. The placenta was thoroughly inspected and was noted to be intact.  mL. Information for the patient's :  [facility] [[id number]]       Labor Events:    Labor:      Steroids:     Cervical Ripening Date/Time:       Cervical Ripening Type:     Antibiotics During Labor:     Rupture Identifier:      Rupture Date/Time: 2022 3:45 AM   Rupture Type: AROM   Amniotic Fluid Volume:      Amniotic Fluid Description: Clear    Amniotic Fluid Odor: None    Induction:         Induction Date/Time:        Indications for Induction:      Augmentation:     Augmentation Date/Time:      Indications for Augmentation:     Labor complications:          Additional complications:        Delivery Events:  Indications For Episiotomy:     Episiotomy: None   Perineal Laceration(s): None   Repaired:     Periurethral Laceration Location:      Repaired:     Labial Laceration Location:     Repaired:     Sulcal Laceration Location:     Repaired:     Vaginal Laceration Location:     Repaired:     Cervical Laceration Location:     Repaired:     Repair Suture: None   Number of Repair Packets:     Estimated Blood Loss (ml):  ml   Quantitative Blood Loss (ml)                Delivery Date: 2022    Delivery Time: 6:26 AM  Delivery Type: Vaginal, Spontaneous  Sex:  Female    Gestational Age: 41w4d   Delivery Clinician:  Zoey Santiago  Living Status: Living   Delivery Location: L&D            APGARS  One minute Five minutes Ten minutes   Skin color: 1   1        Heart rate: 2   2        Grimace: 2   2        Muscle tone: 2   2        Breathin   2        Totals: 9   9            Presentation: Vertex    Position:        Resuscitation Method:  None     Meconium Stained: None      Cord Information: 3 Vessels  Complications: None  Cord around:    Delayed cord clamping? Yes  Cord clamped date/time:   Disposition of Cord Blood:      Blood Gases Sent?: No    Placenta:  Date/Time: 2022  6:35 AM  Removal: Manual Removal      Appearance: Normal     [place] Measurements:  Birth Weight:        Birth Length:        Head Circumference:        Chest Circumference:       Abdominal Girth: Other Providers:   SUPRIYA Mujica;WALI SOFIA;DILLAN MICHAELS;LENORA KIRBY Care, Obstetrician;Primary Nurse;Primary [place] Nurse;Primary Nurse;Nursery Nurse;Charge Nurse           Group B Strep:   Lab Results   Component Value Date/Time    GrBStrep, External Positive  2018 12:00 AM     Information for the patient's :  Claudette Aran [[id number]]   No results found for: ABORH, PCTABR, PCTDIG, BILI, ABORHEXT, ABORH     No results for input(s): PCO2CB, PO2CB, HCO3I, SO2I, IBD, PTEMPI, SPECTI, PHICB, ISITE, IDEV, IALLEN in the last 72 hours.

## 2022-07-14 NOTE — PROGRESS NOTES
The patient complains of intermittent pelvic pressure and is otherwise comfortable with her epidural.    Visit Vitals  /66 (BP 1 Location: Right upper arm, BP Patient Position: At rest)   Pulse 92   Temp 98.5 °F (36.9 °C)   Resp 17   Ht 5' (1.524 m)   Wt 80.7 kg (178 lb)   SpO2 100%   BMI 34.76 kg/m²     FHR: 135 moderate variability, accelerations, no decels, cat 1  Quanah: contractions q 4-5 minutes  SVE: 6-7/90/-2 vtx, amniotomy- clear fluid cervix reduced to 5/90/-2 s/p amniotomy    Ass/Plan:  at 40 2/7 wks in active labor, cat 1 fetal tracing  Labor management. Anticipate .

## 2022-07-14 NOTE — LACTATION NOTE
This note was copied from a baby's chart. Experienced breastfeeding mother. She has a history of low breast milk supply - she did not try any medications or herbs to boost her milk supply. LC suggested she try oatmeal and to discuss trying Moringa with her physician. Discussed with mother her plan for feeding. Reviewed the benefits of exclusive breast milk feeding during the hospital stay. Informed her of the risks of using formula to supplement in the first few days of life as well as the benefits of successful breast milk feeding; referred her to the Breastfeeding booklet about this information. She acknowledges understanding of information reviewed and states that it is her plan to breast/formula feed her infant. Will support her choice and offer additional information as needed. Mother given a "[employer]" hand pump for home use. Instructions for use given. Encouraged mom to attempt feeding with baby led feeding cues. Just as sucking on fingers, rooting, mouthing. Looking for 8-12 feedings in 24 hours. Don't limit baby at breast, allow baby to come of breast on it's own. Baby may want to feed  often and may increase number of feedings on second day of life. Skin to skin encouraged. If baby doesn't nurse,  Mom should  hand express  10-20 drops of colostrum and drip into baby's mouth, or give to baby by finger feeding, cup feeding, or spoon feeding at least every 2-3 hours. Mother will successfully establish breastfeeding by feeding in response to early feeding cues   or wake every 3h, will obtain deep latch, and will keep log of feedings/output. Taught to BF at hunger cues and or q 2-3 hrs and to offer 10-20 drops of hand expressed colostrum at any non-feeds. Breast Assessment  Left Breast: Medium,Large  Left Nipple: Everted,Intact,Short  Right Breast: Medium,Large  Right Nipple:  Intact,Short  Breast- Feeding Assessment  Attends Breast-Feeding Classes: No  Breast-Feeding Experience: Yes (Breast fed 2 other babies for 6 months - history of low breast milk supply)  Breast Trauma/Surgery: No  Type/Quality: Good  Lactation Consultant Visits  Breast-Feedings: Good  (Mother last breast fed baby a 0700 for 40 minutes.)     Mother given breastfeeding handouts and lactation warm line in [place].

## 2022-07-14 NOTE — PROGRESS NOTES
1412 This RN assisted pt up to void. Epidural removed at this time. Pt unable to void. Do Sims RN made aware.

## 2022-07-14 NOTE — PROGRESS NOTES
0115- Report received from [facility] 136. Care taken over at this time with José Luis Jackson RN   5648- MD in room to SVE and ERNIE   0345- Dr. Ruth Nguyen AROM and performed SVE, fluid was clear.  MD stated that vercix deduced after ROM to 6/100/-1  0550- SVE 10/100/+1  3853- MD in for delivery   4706- nursery called   0626- Delivery time   0700- report given to heather BOWIE care relinquished at this time

## 2022-07-14 NOTE — PROGRESS NOTES
0715: Bedside and Verbal shift change report given to NILSON Mayorga RN (oncoming nurse) by Jhoan Berumen RN (offgoing nurse). Report included the following information SBAR, Kardex, Procedure Summary, Intake/Output, MAR, Recent Results, Med Rec Status and Quality Measures. 1913: Straight cath for 1000ml at this time. 8209: SBAR given to Ina Harris RN.

## 2022-07-14 NOTE — H&P
History & Physical    Name: Jalyn Butt MRN: [id number]  SSN: [id number]    YOB: 1990  Age: 34 y.o. Sex: female        Subjective:     Estimated Date of Delivery: 22  OB History    Para Term  AB Living   3 2 2     2   SAB IAB Ectopic Molar Multiple Live Births           0 2      # Outcome Date GA Lbr Walter/2nd Weight Sex Delivery Anes PTL Lv   3 Current            2 Term 10/17/18 38w1d  3.005 kg F Vag-Spont EPIDURAL AN N SARAH   1 Term 12/10/15 38w0d  2.695 kg Chryl Burks       Ms. Rosana Covington is a  admitted with pregnancy at 40w2d for early labor. Reports regular painful uterine contractions every 4-5 minutes. Denies leakage of vaginal fluid and vaginal bleeding. Prenatal course Is complicated by late prenatal care starting at 22 wks and anemia. Please see prenatal records for details. Past Medical History:   Diagnosis Date    Anemia     Screening for malignant neoplasm of the cervix 2018    Negative (no hpv)      GynHx: denies STIs    Past Surgical History:   Procedure Laterality Date    HX CHOLECYSTECTOMY      HX WISDOM TEETH EXTRACTION       Social History     Occupational History    Not on file   Tobacco Use    Smoking status: Never Smoker    Smokeless tobacco: Never Used    Tobacco comment: No vapor or e-cigs    Vaping Use    Vaping Use: Never used   Substance and Sexual Activity    Alcohol use: No     Alcohol/week: 0.0 standard drinks    Drug use: No    Sexual activity: Not Currently     Partners: Male     Birth control/protection: None     Family History   Problem Relation Age of Onset    No Known Problems Other     No Known Problems Mother     No Known Problems Father        No Known Allergies  Prior to Admission medications    Medication Sig Start Date End Date Taking? Authorizing Provider   ferrous sulfate (Iron) 325 mg (65 mg iron) tablet Take  by mouth Daily (before breakfast).    Yes Provider, Historical   prenatal vit37/iron/folic acid (PRENATA PO) Take  by mouth. Yes Provider, Historical        Review of Systems: A comprehensive review of systems was negative except for that written in the HPI. Objective:     Vitals:  Vitals:    07/13/22 2358 07/14/22 0008   BP:  124/66   Pulse:  92   Resp:  17   Temp:  98.5 °F (36.9 °C)   SpO2:  100%   Weight: 80.7 kg (178 lb)    Height: 5' (1.524 m)         Physical Exam:  Patient without distress. Heart: Regular rate and rhythm or S1S2 present  Lung: clear to auscultation throughout lung fields, no wheezes, no rales, no rhonchi and normal respiratory effort  Abdomen: soft, nontender, gravid  Fundus: soft and non tender  Perineum: blood absent, amniotic fluid absent  Cervical Exam: 5/70/-2 vtx (exam by nurse)  Lower Extremities:  - Edema No  Membranes:  Intact  Fetal Heart Rate: Baseline: 159 per minute  Variability: moderate  Accelerations: yes  Decelerations: none  Uterine contractions: regular, every 2-5 minutes    Prenatal Labs:   Lab Results   Component Value Date/Time    ABO/Rh(D) A POSITIVE 03/10/2022 02:25 PM    Rubella, External immune 05/11/2018 12:00 AM    GrBStrep, External Positive  09/27/2018 12:00 AM    HBsAg, External neg 05/11/2018 12:00 AM    HIV, External neg 05/11/2018 12:00 AM    Gonorrhea, External neg 05/11/2018 12:00 AM    Chlamydia, External neg 05/11/2018 12:00 AM    ABO,Rh A Positive 06/12/2015 12:00 AM         Assessment/Plan:     Active Problems:    Pregnancy. [id number]. ALISSA=7/5/22 (3/10/2022)      Overview:       - U=D. LMP for dating: 10/5/21 -> ALISSA=7/5/22      - late [facility] @ 22wks      - Anemia: hgb=10.8 > iron BID; (4/7) hgb=10.6, ferritin=17, Fe Sat=14%      - Panorama (XX) low risk. Horizon 27 neg. AFP low risk       - US (3/10/22) 22+2. Post placenta. Nl morph. - US (6/10/22) 36+4 @ 35+3. 2882gn (69%). BRADY=12.67. Vtx.      -IOL (7/19/22).  CHRISTENSEN (7/18)                  Late prenatal care (7/14/2022)      Anemia affecting pregnancy in third trimester (2022)        at 40 2/7 wks in early labor. Plan: Admit for labor management. .  Group B Strep was negative.   Epidural.

## 2022-07-15 PROCEDURE — 2709999900 HC NON-CHARGEABLE SUPPLY

## 2022-07-15 PROCEDURE — 74011250637 HC RX REV CODE- 250/637: Performed by: OBSTETRICS & GYNECOLOGY

## 2022-07-15 PROCEDURE — 65270000029 HC RM PRIVATE

## 2022-07-15 PROCEDURE — 59400 OBSTETRICAL CARE: CPT | Performed by: OBSTETRICS & GYNECOLOGY

## 2022-07-15 RX ORDER — IBUPROFEN 800 MG/1
800 TABLET ORAL
Qty: 30 TABLET | Refills: 1 | Status: SHIPPED | OUTPATIENT
Start: 2022-07-15

## 2022-07-15 RX ADMIN — IBUPROFEN 800 MG: 800 TABLET, FILM COATED ORAL at 22:03

## 2022-07-15 RX ADMIN — Medication: at 18:31

## 2022-07-15 RX ADMIN — IBUPROFEN 800 MG: 800 TABLET, FILM COATED ORAL at 14:01

## 2022-07-15 RX ADMIN — IBUPROFEN 800 MG: 800 TABLET, FILM COATED ORAL at 05:57

## 2022-07-15 NOTE — LACTATION NOTE
Detail Level: Detailed This note was copied from a baby's chart. LC attempted to re-visit mother. She is still sleeping. Quality 110: Preventive Care And Screening: Influenza Immunization: Influenza Immunization previously received during influenza season

## 2022-07-15 NOTE — PROGRESS NOTES
Post-Partum Day Number 1 Progress Note      Patient doing well post-partum without significant complaint. Some cramping and pressure with breast feeding and ambulating. She is voiding without difficulty, she reports normal lochia. She is ambulatiing without dizziness. Her pain is well controlled with oral pain medication - motrin. She is tolerating general diet. Breast feeding. Vitals:  Patient Vitals for the past 8 hrs:   BP Temp Pulse Resp SpO2   07/15/22 0842 125/77 98.1 °F (36.7 °C) 83 16 97 %     Temp (24hrs), Av.2 °F (36.8 °C), Min:98.1 °F (36.7 °C), Max:98.4 °F (36.9 °C)        Exam:  Patient without distress. Lungs: CTA bilaterally               CV: regular rate/rhythm, no rubs or gallops, no murmur               Abdomen soft, nontender, nondistended, normal bowel sounds               Uterus: fundus firm at level of umbilicus, nontender               Lower extremities are negative for cords or tenderness, no swelling. Labs: No results found for this or any previous visit (from the past 24 hour(s)). Lab Results   Component Value Date/Time    Rubella, External immune 2018 12:00 AM    GrBStrep, External Positive  2018 12:00 AM    HBsAg, External neg 2018 12:00 AM    HIV, External neg 2018 12:00 AM    Gonorrhea, External neg 2018 12:00 AM    Chlamydia, External neg 2018 12:00 AM       Assessment and Plan:   Postpartum Day #1 S/P . Doing well.    - routine care   - anticipate discharge in AM  - kpad and binder

## 2022-07-15 NOTE — LACTATION NOTE
This note was copied from a baby's chart. Mother and baby for discharge. LC in to see mother - she was sleeping.

## 2022-07-15 NOTE — LACTATION NOTE
This note was copied from a baby's chart. Mother has been breastfeeding well. Mother states her left nipple is slightly tender when baby latches on initially. Care for sore/tender nipples discussed:  ways to improve positioning and latch practiced and discussed, hand express colostrum after feedings and let air dry, light application of lanolin, hydrogel pads, seek comfortable laid back feeding position, start feedings on least sore side first.    Reviewed breastfeeding basics:  Supply and demand, breastfeed baby 8-12 times in 24 hours, feed baby on demand,  stomach size, early  Feeding cues, skin to skin, positioning and baby led latch-on, assymetrical latch with signs of good, deep latch vs shallow, feeding frequency and duration, and log sheet for tracking infant feedings and output. Breastfeeding Booklet and Warm line information given. Discussed typical  weight loss and the importance of infant weight checks with pediatrician 1-2 post discharge. Mother will successfully establish breastfeeding by feeding in response to early feeding cues   or wake every 3h, will obtain deep latch, and will keep log of feedings/output. Taught to BF at hunger cues and or q 2-3 hrs and to offer 10-20 drops of hand expressed colostrum at any non-feeds.       Breast Assessment  Left Breast: Medium,Large  Left Nipple: Everted,Intact,Short  Right Breast: Medium,Large  Right Nipple: Everted,Intact,Short  Breast- Feeding Assessment  Attends Breast-Feeding Classes: No  Breast-Feeding Experience: Yes  Breast Trauma/Surgery: No  Type/Quality: Good (Baby breast fed well last night and this morning)  Lactation Consultant Visits  Breast-Feedings: Good  (Baby last breast fed at 0900 for 30 minutes on left breast. Encouraged mother to call [facility] for breastfeeding assistance.)

## 2022-07-15 NOTE — DISCHARGE SUMMARY
Obstetrical Discharge Summary     Name: Jesus Hodge MRN: [id number]  SSN: [id number]    YOB: 1990  Age: 34 y.o. Sex: female      Admit Date: 2022    Discharge Date: 2022  1:51 PM    Admitting Physician: Noemi Moyer MD     Attending Physician:  Felicity att. providers found     Admission Diagnoses: Pregnancy [Z34.90]    Procedures for this admission:     Discharge Diagnoses:   Information for the patient's :  Jozef Tim [[id number]]   Delivery of a 7 lb 8.1 oz (3.405 kg) female infant via Vaginal, Spontaneous on 2022 at 6:26 AM  by Noemi Moyer. Apgars were 9  and 9 . Discharge Condition: good    Disposition:  home      Hospital Course: Normal hospital course following the delivery. Patient Instructions:   Current Discharge Medication List      START taking these medications    Details   ibuprofen (MOTRIN) 800 mg tablet Take 1 Tablet by mouth every eight (8) hours as needed for Pain. Qty: 30 Tablet, Refills: 1         CONTINUE these medications which have NOT CHANGED    Details   prenatal vit37/iron/folic acid (PRENATA PO) Take  by mouth. Associated Diagnoses: Missed menses; Positive pregnancy test         STOP taking these medications       ferrous sulfate (Iron) 325 mg (65 mg iron) tablet Comments:   Reason for Stopping:               Reference my discharge instructions.     Follow-up Appointments   Procedures    FOLLOW UP VISIT Appointment in: 6 Weeks     Standing Status:   Standing     Number of Occurrences:   1     Order Specific Question:   Appointment in     Answer:   6 Weeks        Signed By:  Corlis Opitz, MD     2022

## 2022-07-16 VITALS
RESPIRATION RATE: 16 BRPM | HEIGHT: 60 IN | DIASTOLIC BLOOD PRESSURE: 80 MMHG | HEART RATE: 80 BPM | BODY MASS INDEX: 34.95 KG/M2 | SYSTOLIC BLOOD PRESSURE: 115 MMHG | TEMPERATURE: 99.2 F | WEIGHT: 178 LBS | OXYGEN SATURATION: 95 %

## 2022-07-16 PROCEDURE — 74011250637 HC RX REV CODE- 250/637: Performed by: OBSTETRICS & GYNECOLOGY

## 2022-07-16 RX ADMIN — IBUPROFEN 800 MG: 800 TABLET, FILM COATED ORAL at 06:30

## 2022-07-16 NOTE — PROGRESS NOTES
Post-Partum Progress Note    Patient doing well post-partum without significant complaint. She is voiding without difficulty, she reports normal lochia. Her pain is well controlled with oral pain medication. She is tolerating a general diet. Patient complains of bilateral calf soreness that feels better with socks. She reports also has some lower abdominal soreness that is improving. Delivery information:  Information for the patient's :  Jozef Tim [[id number]]   Delivery of a 7 lb 8.1 oz (3.405 kg) female infant via Vaginal, Spontaneous on 2022 at 6:26 AM  by [facility]. Apgars were 9  and 9 . Vitals:  No data found. Temp (24hrs), Av.5 °F (36.9 °C), Min:97.8 °F (36.6 °C), Max:99.2 °F (37.3 °C)    Visit Vitals  /80 (BP 1 Location: Left upper arm, BP Patient Position: At rest)   Pulse 80   Temp 99.2 °F (37.3 °C)   Resp 16   Ht 5' (1.524 m)   Wt 178 lb (80.7 kg)   SpO2 95%   Breastfeeding Unknown   BMI 34.76 kg/m²       Exam:    General: Patient without distress. Abdomen: soft, fundus firm at level of umbilicus, nontender  Lower extremities: negative for cords or swelling or redness. Mild tenderness b/l calf appears to be muscular    Labs: No results found for this or any previous visit (from the past 24 hour(s)). Assessment:    1. Postpartum S/P spontaneous vaginal delivery   2. Patient doing well without significant complications    Plan:  1. Continue routine postpartum care  2. Routine perineal care and maternal education   3. Oral pain medications and bowel regimen as needed       4. DVT/PE precautions, discussed s/sx of muscle discomfort vs DVT, notify MD if NI  5.  Discharge planning    Tracy Ortega MD

## 2022-07-16 NOTE — ROUTINE PROCESS
Patient off unit in stable condition via wheelchair with volunteers for discharge home per  MD.  Patient is to follow up in 6 weeks and is aware. Prescriptions called to patients pharmacy. Patient denies H/A, dizziness, nausea and or vomiting or pain at this time. Infant in car seat with mom.

## 2022-07-16 NOTE — DISCHARGE INSTRUCTIONS
POST DELIVERY DISCHARGE INSTRUCTIONS    Name: Katarzyna Chacon  YOB: 1990      General:     Diet/Diet Restrictions:  Eight 8-ounce glasses of fluid daily (primarily water); avoid excessive caffeine intake. Meals/snacks as desired which are high in fiber and complex carbohydrates and low in fat and cholesterol. Physical Activity / Restrictions / Safety:     Avoid heavy lifting, no more that 15 lbs. For 2-3 weeks; No driving while taking narcotic pain medication. Post  patients should not drive until pain free. No intercourse until seen for your 6 week postpartum visit, no douching or tampon use. No swimming or tub baths for 6 weeks. May resume exercise in 4-6 weeks. Discharge Instructions/Special Treatment/Home Care Needs:     Continue prenatal vitamins. Continue to use squirt bottle with warm water on your episiotomy after each bathroom use until bleeding stops. Take stool softeners daily if constipated. Call your doctor for the following:     Fever over 101 degrees by mouth. Vaginal bleeding heavier than a normal menstrual period or lost larger than a golf ball. Red streaks or increased swelling of legs, painful red streaks on your breast.  Painful urination, or increased pain, redness or discharge with your incision. Pain Management:     Pain Management:   Take Acetaminophen (Tylenol) or Ibuprofen (Advil, Motrin), as directed for pain. Heating pad as needed. For hemorrhoidal discomfort, use Tucks and Anusol cream as needed and directed. Follow-Up Care:     Appointment with MD:   Follow-up Appointments   Procedures    FOLLOW UP VISIT Appointment in: 6 Weeks     Standing Status:   Standing     Number of Occurrences:   1     Order Specific Question:   Appointment in     Answer:   6 Weeks     Telephone number: [phone]    ** PLEASE BE AWARE THAT THE COVID POLICY HAS BEEN UPDATED.   CURRENTLY, BREAST FEEDING NEWBORNS ARE ALLOWED, HOWEVER, THIS POLICY IS SUBJECT TO CHANGE GIVEN THE UNCERTAINTY OF COVID. IF YOU WANT TO BRING YOUR  TO YOUR POSTPARTUM APPOINTMENT, YOU MAY WANT TO CHECK WITH THE OFFICE TO CONFIRM THE CURRENT POLICY.  NO OTHER CHILDREN ALLOWED AT YOUR POSTPARTUM VISIT.   **        Signed By: Ruel Ni MD                                                                                                   Date: 7/15/2022 Time: 9:04 AM

## 2022-08-22 NOTE — PROGRESS NOTES
Postpartum evaluation    Murali Pineda is a 34 y.o. female who presents for a postpartum exam.     She is now six weeks post normal spontaneous vaginal delivery.  22 ([facility] - Dr. Susan Dukes). Baby girl. Her baby is doing well. She has had no menses since delivery. She has had the following significant problems since her delivery: none    The patient is breast feeding/pumping and supplementing. without difficulty. The patient would like to discuss  for birth control. She is currently taking: no medications. She is due for her next AE in 2023.      Visit Vitals  /80   Pulse (!) 109   Wt 163 lb (73.9 kg)   Breastfeeding Yes   BMI 31.83 kg/m²       PHYSICAL EXAMINATION    Constitutional  Appearance: well-nourished, well developed, alert, in no acute distress    HENT  Head and Face: appears normal    Neck  Inspection/Palpation: normal appearance, no masses or tenderness  Lymph Nodes: no lymphadenopathy present  Thyroid: gland size normal, nontender, no nodules or masses present on palpation    Breasts  Inspection of Breasts: breasts symmetrical, no skin changes, no discharge present, nipple appearance normal, no skin retraction present  Palpation of Breasts and Axillae: no masses present on palpation, no breast tenderness  Axillary Lymph Nodes: no lymphadenopathy present    Gastrointestinal  Abdominal Examination: abdomen non-tender to palpation, normal bowel sounds, no masses present  Liver and spleen: no hepatomegaly present, spleen not palpable  Hernias: no hernias identified    Genitourinary  External Genitalia: normal appearance for age, no discharge present, no tenderness present, no inflammatory lesions present, no masses present, moderate atrophy present c/w PP/lactating  Vagina: normal vaginal vault without central or paravaginal defects, no discharge present, no inflammatory lesions present, no masses present  Bladder: non-tender to palpation  Urethra: appears normal  Cervix: normal   Uterus: normal size, shape and consistency  Adnexa: no adnexal tenderness present, no adnexal masses present  Perineum: perineum within normal limits, no evidence of trauma, no rashes or skin lesions present  Anus: anus within normal limits, no hemorrhoids present  Inguinal Lymph Nodes: no lymphadenopathy present    Skin  General Inspection: no rash, no lesions identified    Neurologic/Psychiatric  Mental Status:  Orientation: grossly oriented to person, place and time  Mood and Affect: mood normal, affect appropriate    Assessment:  Normal postpartum check    Plan:  RTO for AE. Due in March. Rx for contraception: micronor. Can begin today. Backup method first 7 days. Can try moringa, fenugreek, blessed thistle for milk supply  Reviewed options. Will probably want Mirena. Rec waiting until 3 months PP to minimize risk of complications. Given info on other options. Orders Placed This Encounter    norethindrone (MICRONOR) 0.35 mg tab     Sig: Take 1 Tablet by mouth daily.      Dispense:  3 Dose Pack     Refill:  2

## 2022-08-23 ENCOUNTER — OFFICE VISIT (OUTPATIENT)
Dept: OBGYN CLINIC | Age: 32
End: 2022-08-23
Payer: MEDICAID

## 2022-08-23 VITALS
DIASTOLIC BLOOD PRESSURE: 80 MMHG | BODY MASS INDEX: 31.83 KG/M2 | WEIGHT: 163 LBS | HEART RATE: 109 BPM | SYSTOLIC BLOOD PRESSURE: 118 MMHG

## 2022-08-23 PROCEDURE — 0503F POSTPARTUM CARE VISIT: CPT | Performed by: OBSTETRICS & GYNECOLOGY

## 2022-08-23 RX ORDER — ACETAMINOPHEN AND CODEINE PHOSPHATE 120; 12 MG/5ML; MG/5ML
1 SOLUTION ORAL DAILY
Qty: 3 DOSE PACK | Refills: 2 | Status: SHIPPED | OUTPATIENT
Start: 2022-08-23

## 2022-11-18 ENCOUNTER — OFFICE VISIT (OUTPATIENT)
Dept: OBGYN CLINIC | Age: 32
End: 2022-11-18
Payer: MEDICAID

## 2022-11-18 VITALS
WEIGHT: 158 LBS | DIASTOLIC BLOOD PRESSURE: 80 MMHG | SYSTOLIC BLOOD PRESSURE: 123 MMHG | BODY MASS INDEX: 30.86 KG/M2 | HEART RATE: 109 BPM

## 2022-11-18 DIAGNOSIS — Z30.430 ENCOUNTER FOR IUD INSERTION: Primary | ICD-10-CM

## 2022-11-18 LAB
HCG URINE, QL. (POC): NEGATIVE
VALID INTERNAL CONTROL?: YES

## 2022-11-18 PROCEDURE — 81025 URINE PREGNANCY TEST: CPT | Performed by: OBSTETRICS & GYNECOLOGY

## 2022-11-18 PROCEDURE — 58300 INSERT INTRAUTERINE DEVICE: CPT | Performed by: OBSTETRICS & GYNECOLOGY

## 2022-11-18 NOTE — PROGRESS NOTES
IUD INSERTION  Indications:  Edy Richmond is a ,  34 y.o. female OTHER No LMP recorded. Her LMP was normal in duration and amount of flow. She  presents for insertion of an IUD. The risks, benefits and alternatives of IUD insertion were discussed in detail at last visit. She also has reviewed Mirena information. She has elected to proceed with the insertion today and she states she has no further questions. A urine pregnancy test was Negative    Results for orders placed or performed in visit on 22   AMB POC URINE PREGNANCY TEST, VISUAL COLOR COMPARISON   Result Value Ref Range    VALID INTERNAL CONTROL POC Yes     HCG urine, Ql. (POC) Negative Negative         Procedure: The pelvic exam revealed normal external genitalia. On bimanual exam the uterus was anteverted and normal in size with no tenderness present. A speculum was inserted into the vagina and the cervix was visualized. The cervix was prepped with zephiran solution. The anterior lip of the cervix was not grasped with a single toothed tenaculum. The uterus was sounded with a flexible Mirena sound to 7 centimeters. A Mirena IUD was then inserted without difficulty. The string was cut to 4 centimeters. She experienced a moderate  amount of cramping. Post Procedure Status:   She tolerated the procedure with moderate discomfort. The patient was observed for 15 minutes after the insertion. There were no complications. Patient was discharged in stable condition. The IUD was not supplied by the patient.

## 2022-11-18 NOTE — PROGRESS NOTES
Domenic Frankel is a 34 y.o. female presents for a problem visit. Chief Complaint   Patient presents with    Insertion Iud         No LMP recorded. Birth Control: oral progesterone-only contraceptive. Last Pap: normal obtained  3/2022        1. Have you been to the ER, urgent care clinic, or hospitalized since your last visit? No    2. Have you seen or consulted any other health care providers outside of the [address] [place] [address] since your last visit? No    Device number [id number], expiration date 11/2024    Chart reviewed for the following:   Collette Harris RN, have reviewed the History, Physical and updated the Allergic reactions for Gisel E [address] performed immediately prior to start of procedure:   Collette Harris RN, have performed the following reviews on Domenic Frankel prior to the start of the procedure:            * Patient was identified by name and date of birth   * Agreement on procedure being performed was verified  * Risks and Benefits explained to the patient  * Procedure site verified and marked as necessary  * Patient was positioned for comfort  * Consent was signed and verified     Time: 1320    Date of procedure: 11/18/2022    Procedure performed by:  Fadi Eubanks MD    Provider assisted by: Veena Barraza RN      Patient assisted by: spouse    How tolerated by patient: tolerated the procedure well with no complications    Post Procedural Pain Scale: 4 - Hurts Little More    Comments: given hot pack for abdomen    Examination chaperoned by Tanner Draper RN.

## 2022-12-30 ENCOUNTER — OFFICE VISIT (OUTPATIENT)
Dept: OBGYN CLINIC | Age: 32
End: 2022-12-30

## 2022-12-30 VITALS
HEART RATE: 100 BPM | DIASTOLIC BLOOD PRESSURE: 77 MMHG | SYSTOLIC BLOOD PRESSURE: 120 MMHG | WEIGHT: 157 LBS | BODY MASS INDEX: 30.66 KG/M2

## 2022-12-30 DIAGNOSIS — R10.2 PERINEAL PAIN: ICD-10-CM

## 2022-12-30 DIAGNOSIS — Z30.431 SURVEILLANCE OF (INTRAUTERINE) CONTRACEPTIVE DEVICE: Primary | ICD-10-CM

## 2022-12-30 RX ORDER — ESTRADIOL 0.1 MG/G
CREAM VAGINAL
Qty: 42.5 G | Refills: 1 | Status: SHIPPED | OUTPATIENT
Start: 2022-12-30

## 2022-12-30 NOTE — PROGRESS NOTES
IUD followup note    This is a follow-up visit for Evan Kowng is a ,  34 y.o. female OTHER No LMP recorded. (Menstrual status: IUD). .     She had a Mirena IUD placed 22. Since the IUD placement, she has had some mild non-menstrual bleeding after initial insertion. Also had sgnf cramping for the first 8 days. These sx have resolved. She has had no fever. Associated signs and symptoms: she denies dyspareunia, expulsion, heavy bleeding, increased pain, fever, and pelvic pain. She has not tried to check strings. She has not been sexually active since insertion. No issues with strings. Also c/o of area of perineal sensitivity/pain with IC.  states she has always been sensitive.  22, intact perineum, no laceration or stitches. Ultrasound was done today and revealed appropriate placement of the IUD in the endometrial cavity. TRANSVAGINAL ULTRASOUND PERFORMED  UTERUS IS ANTEVERTED, NORMAL IN SIZE AND ECHOGENICITY. ENDOMETRIUM MEASURES 3-4MM IN THICKNESS. NO EVIDENCE OF MASSES OR ABNORMALITIES ARE SEEN. IUD IS SEEN IN THE PROPER POSITION WITHIN THE ENDOMETRIAL CAVITY IN THE UTERINE FUNDUS. RIGHT OVARY APPEARS TO HAVE MULTIPLE PERIPHERY ARRANGED ATRETIC FOLLICLES <1FV. LEFT OVARY APPEARS TO HAVE MULTIPLE PERIPHERY ARRANGED ATRETIC FOLLICLES <5FF. NO FREE FLUID SEEN IN THE CDS.     Past Medical History:   Diagnosis Date    Anemia     Screening for malignant neoplasm of the cervix 2018    Negative (no hpv)      Past Surgical History:   Procedure Laterality Date    HX CHOLECYSTECTOMY      HX WISDOM TEETH EXTRACTION       Social History     Occupational History    Not on file   Tobacco Use    Smoking status: Never    Smokeless tobacco: Never    Tobacco comments:     No vapor or e-cigs    Vaping Use    Vaping Use: Never used   Substance and Sexual Activity    Alcohol use: No     Alcohol/week: 0.0 standard drinks    Drug use: No    Sexual activity: Not Currently Partners: Male     Birth control/protection: None     Family History   Problem Relation Age of Onset    No Known Problems Other     No Known Problems Mother     No Known Problems Father        No Known Allergies  Prior to Admission medications    Medication Sig Start Date End Date Taking? Authorizing Provider   prenatal vit37/iron/folic acid (PRENATA PO) Take  by mouth. Yes Provider, Historical            Objective:  Visit Vitals  /77   Pulse 100   Wt 157 lb (71.2 kg)   BMI 30.66 kg/m²       Physical Exam:   PHYSICAL EXAMINATION    Constitutional  Appearance: well-nourished, well developed, alert, in no acute distress    Gastrointestinal  Abdominal Examination: abdomen non-tender to palpation,  no masses present  Liver and spleen: no hepatomegaly present, spleen not palpable  Hernias: no hernias identified    Genitourinary  External Genitalia: normal appearance for age, no discharge present, no tenderness present, no inflammatory lesions present, no masses present, no atrophy present  Vagina: normal vaginal vault without central or paravaginal defects, no discharge present, no inflammatory lesions present, no masses present  Bladder: non-tender to palpation  Urethra: appears normal  Cervix: normal with IUD string visible and appropriate length, 3-4cm  Uterus: normal size, shape and consistency  Adnexa: no adnexal tenderness present, no adnexal masses present  Perineum: appears nl, no obvious lesions           Skin  General Inspection: no rash, no lesions identified    Neurologic/Psychiatric  Mental Status:  Orientation: grossly oriented to person, place and time  Mood and Affect: mood normal, affect appropriate    Assessment:  S/P mirena insertion. US today shows nl position. Strings nl on exam.  Perineum with area of focal tenderness in small skin fold, no other lesions. ? possibly d/t some mild atrophy d/t breast feeding      Plan:   eRX sent for estrace vaginal cream.  Can apply small amount daily for 2wks, then 2x/wk. Monitor milk supply. AE due March/April  Enc her to try to check strings once.

## 2022-12-30 NOTE — PROGRESS NOTES
Cely Zamudio is a 34 y.o. female presents for a problem visit. Chief Complaint   Patient presents with    Checkup IUD     Pt here today for ultrasound follow up to check IUD position. Pt has no complaints and states only a little bleeding right after insertion. 1. Have you been to the ER, urgent care clinic, or hospitalized since your last visit? No    2. Have you seen or consulted any other health care providers outside of the [address] [place] [address] since your last visit?  No

## 2023-05-24 RX ORDER — ESTRADIOL 0.1 MG/G
CREAM VAGINAL
COMMUNITY
Start: 2022-12-30

## 2023-10-02 NOTE — PROGRESS NOTES
[late entry - pt seen earlier today ~ 1220] Post-Partum Day Number 1 Progress Note Patient doing well post-partum without significant complaint. She is voiding without difficulty,does have some irritation with voiding d/t catheter; she reports normal lochia. She is ambulatiing without dizziness. Her pain is well controlled with oral pain medication. She is tolerating general diet. Vitals:   
Patient Vitals for the past 8 hrs: 
 BP Temp Pulse Resp 10/18/18 1312 123/72 99.1 °F (37.3 °C) 78 16 Temp (24hrs), Av.5 °F (36.9 °C), Min:97.9 °F (36.6 °C), Max:99.1 °F (37.3 °C) Exam:  Patient without distress. Lungs: CTA bilaterally CV: regular rate/rhythm, no rubs or gallops, no murmur Abdomen soft, nontender, nondistended, normal bowel sounds Uterus: fundus firm at level of umbilicus, nontender Lower extremities are negative for cords or tenderness, no swelling. Labs: No results found for this or any previous visit (from the past 24 hour(s)). Lab Results Component Value Date/Time  
 Rubella, External immune 2018 GrBStrep, External Positive  2018 HBsAg, External neg 2018 HIV, External neg 2018 Gonorrhea, External neg 2018 Chlamydia, External neg 2018 Assessment and Plan:   Postpartum Day #1 S/P . Doing well.  
 - routine care 
 - anticipate discharge in AM 
              
 
 How Severe Are Your Spot(S)?: moderate Hpi Title: Evaluation of Skin Lesions

## 2024-01-25 ENCOUNTER — OFFICE VISIT (OUTPATIENT)
Age: 34
End: 2024-01-25
Payer: MEDICAID

## 2024-01-25 VITALS
HEIGHT: 60 IN | SYSTOLIC BLOOD PRESSURE: 116 MMHG | WEIGHT: 154 LBS | BODY MASS INDEX: 30.23 KG/M2 | DIASTOLIC BLOOD PRESSURE: 79 MMHG | HEART RATE: 112 BPM

## 2024-01-25 DIAGNOSIS — N94.6 DYSMENORRHEA: ICD-10-CM

## 2024-01-25 DIAGNOSIS — Z30.431 SURVEILLANCE OF (INTRAUTERINE) CONTRACEPTIVE DEVICE: ICD-10-CM

## 2024-01-25 DIAGNOSIS — Z01.419 ENCOUNTER FOR WELL WOMAN EXAM WITH ROUTINE GYNECOLOGICAL EXAM: Primary | ICD-10-CM

## 2024-01-25 PROCEDURE — 99395 PREV VISIT EST AGE 18-39: CPT | Performed by: OBSTETRICS & GYNECOLOGY

## 2024-01-25 SDOH — ECONOMIC STABILITY: FOOD INSECURITY: WITHIN THE PAST 12 MONTHS, THE FOOD YOU BOUGHT JUST DIDN'T LAST AND YOU DIDN'T HAVE MONEY TO GET MORE.: NEVER TRUE

## 2024-01-25 SDOH — ECONOMIC STABILITY: HOUSING INSECURITY
IN THE LAST 12 MONTHS, WAS THERE A TIME WHEN YOU DID NOT HAVE A STEADY PLACE TO SLEEP OR SLEPT IN A SHELTER (INCLUDING NOW)?: NO

## 2024-01-25 SDOH — ECONOMIC STABILITY: FOOD INSECURITY: WITHIN THE PAST 12 MONTHS, YOU WORRIED THAT YOUR FOOD WOULD RUN OUT BEFORE YOU GOT MONEY TO BUY MORE.: NEVER TRUE

## 2024-01-25 SDOH — ECONOMIC STABILITY: INCOME INSECURITY: HOW HARD IS IT FOR YOU TO PAY FOR THE VERY BASICS LIKE FOOD, HOUSING, MEDICAL CARE, AND HEATING?: NOT HARD AT ALL

## 2024-01-25 ASSESSMENT — PATIENT HEALTH QUESTIONNAIRE - PHQ9
SUM OF ALL RESPONSES TO PHQ9 QUESTIONS 1 & 2: 0
2. FEELING DOWN, DEPRESSED OR HOPELESS: 0
SUM OF ALL RESPONSES TO PHQ QUESTIONS 1-9: 0
SUM OF ALL RESPONSES TO PHQ QUESTIONS 1-9: 0
1. LITTLE INTEREST OR PLEASURE IN DOING THINGS: 0
SUM OF ALL RESPONSES TO PHQ QUESTIONS 1-9: 0
SUM OF ALL RESPONSES TO PHQ QUESTIONS 1-9: 0

## 2024-01-25 NOTE — PATIENT INSTRUCTIONS
Catrachita:  - tea twice a day  OR  - 2gm/d (split out over 2-3 doses), can be powder or dietary form)    This is great resource:  [email or web address]

## 2024-01-25 NOTE — PROGRESS NOTES
Dyan Foster is a 33 y.o. female returns for an annual exam     Chief Complaint   Patient presents with    Annual Exam       No LMP recorded. (Menstrual status: IUD).  Her periods are Spotting to moderate in flow and  monthly but irregular in flow .  She has dysmenorrhea.  Problems: no problems  Birth Control: IUD.  Last Pap: normal obtained 3/2022.  She does not have a history of COBY 2, 3 or cervical cancer.   With regard to the Gardisil vaccine, she has not received it yet      
person, place and time  Mood and Affect: mood normal, affect appropriate    No results found for this visit on 01/25/24.      Assessment & Plan:  Routine gynecologic examination.  Pap/HPV neg 3/2022  Mirena (11/18/22).  Has been having sgnf dysmenorrhea ever since placement.  Rec NSAIDs.  Can try mg, radha, heat.  Will anastacia US to check position.  Discussed removal, or replacement with either another mirena or switch to Kyleena.    Counseled re: diet, exercise, healthy lifestyle  Return for yearly wellness visits  Pt counseled regarding co-testing for high risk HPV with pap  Patient verbalized understanding        Orders Placed This Encounter    US NON OB TRANSVAGINAL     Standing Status:   Future     Standing Expiration Date:   1/25/2025

## 2024-02-01 NOTE — PROGRESS NOTES
Dyan Foster is a 33 y.o. female presents for a problem visit.    Chief Complaint   Patient presents with    Dyspareunia     No LMP recorded. (Menstrual status: IUD).  Birth Control:IUD.  Last Pap: normal obtained 5/2022.    The patient is reporting having: IUD Follow Up for pain with intercourse.  Ultrasound today showed:    TRANSVAGINAL ULTRASOUND PERFORMED UTERUS IS ANTEVERTED, NORMAL IN SIZE AND ECHOGENICITY. ENDOMETRIUM MEASURES 5-6MM IN THICKNESS. NO EVIDENCE OF MASSES OR ABNORMALITIES ARE SEEN. IUD IS SEEN IN THE PROPER POSITION WITHIN THE ENDOMETRIAL CAVITY IN THE UTERINE FUNDUS. RIGHT OVARY APPEARS WITHIN NORMAL LIMITS. LEFT OVARY APPEARS WITHIN NORMAL LIMITS. FREE FLUID SEEN IN THE CDS.

## 2024-02-02 ENCOUNTER — OFFICE VISIT (OUTPATIENT)
Age: 34
End: 2024-02-02
Payer: MEDICAID

## 2024-02-02 VITALS
SYSTOLIC BLOOD PRESSURE: 108 MMHG | WEIGHT: 153 LBS | DIASTOLIC BLOOD PRESSURE: 71 MMHG | BODY MASS INDEX: 29.88 KG/M2 | HEART RATE: 98 BPM

## 2024-02-02 DIAGNOSIS — N94.6 DYSMENORRHEA: Primary | ICD-10-CM

## 2024-02-02 PROCEDURE — 99212 OFFICE O/P EST SF 10 MIN: CPT | Performed by: OBSTETRICS & GYNECOLOGY

## 2024-02-02 NOTE — PATIENT INSTRUCTIONS
Catrachita:  - tea twice a day  OR  - 2gm/d (split out over 2-3 doses), can be powder or dietary form)    Increasing dietary fiber with a goal of 40gm/d    This is great resource:  [email or web address]

## 2024-02-02 NOTE — PROGRESS NOTES
OB/GYN Problem Visit        HPI  Dyan Foster is a ,  33 y.o. female who presents for a problem visit.   No LMP recorded. (Menstrual status: IUD).  Mirena (22)    Dysmenorrhea.  Has always has occasional dysmenorrhea.  But since Mirena placed having significant dysmenorrhea with every period.  Takes ibuprofen with some relief.    TRANSVAGINAL ULTRASOUND PERFORMED UTERUS IS ANTEVERTED, NORMAL IN SIZE AND ECHOGENICITY. ENDOMETRIUM MEASURES 5-6MM IN THICKNESS. NO EVIDENCE OF MASSES OR ABNORMALITIES ARE SEEN. IUD IS SEEN IN THE PROPER POSITION WITHIN THE ENDOMETRIAL CAVITY IN THE UTERINE FUNDUS. RIGHT OVARY APPEARS WITHIN NORMAL LIMITS. LEFT OVARY APPEARS WITHIN NORMAL LIMITS. FREE FLUID SEEN IN THE CDS.             Past Medical History:   Diagnosis Date    Anemia     Encounter for insertion of intrauterine contraceptive device (IUD) 2022    Screening for malignant neoplasm of the cervix 03/10/2022    normal HPV neg     Past Surgical History:   Procedure Laterality Date    CHOLECYSTECTOMY      WISDOM TOOTH EXTRACTION       OB History    Para Term  AB Living   3 3 3     3   SAB IAB Ectopic Molar Multiple Live Births             3      # Outcome Date GA Lbr Fernando/2nd Weight Sex Delivery Anes PTL Lv   3 Term 22 40w2d / 00:31 3.405 kg (7 lb 8.1 oz) F Vag-Spont EPI N DOREEN   2 Term 10/17/18 38w1d  3.005 kg (6 lb 10 oz) F Vag-Spont  N DOREEN   1 Term 12/10/15 38w0d  2.695 kg (5 lb 15.1 oz) F   N DOREEN       Social History     Occupational History    Not on file   Tobacco Use    Smoking status: Never    Smokeless tobacco: Never    Tobacco comments:     Quit smoking: No vapor or e-cigs   Vaping Use    Vaping Use: Never used   Substance and Sexual Activity    Alcohol use: No     Alcohol/week: 0.0 standard drinks of alcohol    Drug use: No    Sexual activity: Yes     Partners: Male     Birth control/protection: I.U.D.     Family History   Problem Relation Age of Onset    No Known Problems